# Patient Record
Sex: FEMALE | Race: WHITE | NOT HISPANIC OR LATINO | Employment: PART TIME | URBAN - METROPOLITAN AREA
[De-identification: names, ages, dates, MRNs, and addresses within clinical notes are randomized per-mention and may not be internally consistent; named-entity substitution may affect disease eponyms.]

---

## 2020-01-13 ENCOUNTER — HOSPITAL ENCOUNTER (EMERGENCY)
Facility: HOSPITAL | Age: 55
Discharge: HOME | End: 2020-01-13
Attending: EMERGENCY MEDICINE
Payer: COMMERCIAL

## 2020-01-13 VITALS
RESPIRATION RATE: 20 BRPM | WEIGHT: 145 LBS | TEMPERATURE: 98.8 F | HEART RATE: 99 BPM | BODY MASS INDEX: 21.98 KG/M2 | HEIGHT: 68 IN | SYSTOLIC BLOOD PRESSURE: 142 MMHG | OXYGEN SATURATION: 95 % | DIASTOLIC BLOOD PRESSURE: 78 MMHG

## 2020-01-13 DIAGNOSIS — F10.20 ALCOHOLISM (HCC): ICD-10-CM

## 2020-01-13 DIAGNOSIS — F10.239 ALCOHOL WITHDRAWAL (HCC): ICD-10-CM

## 2020-01-13 DIAGNOSIS — F10.929 ALCOHOL INTOXICATION (HCC): Primary | ICD-10-CM

## 2020-01-13 LAB
ALBUMIN SERPL BCP-MCNC: 3.8 G/DL (ref 3.5–5)
ALP SERPL-CCNC: 108 U/L (ref 46–116)
ALT SERPL W P-5'-P-CCNC: 149 U/L (ref 12–78)
ANION GAP SERPL CALCULATED.3IONS-SCNC: 16 MMOL/L (ref 4–13)
AST SERPL W P-5'-P-CCNC: 377 U/L (ref 5–45)
BASOPHILS # BLD AUTO: 0.08 THOUSANDS/ΜL (ref 0–0.1)
BASOPHILS NFR BLD AUTO: 1 % (ref 0–1)
BILIRUB SERPL-MCNC: 0.3 MG/DL (ref 0.2–1)
BUN SERPL-MCNC: 15 MG/DL (ref 5–25)
CALCIUM SERPL-MCNC: 8.5 MG/DL (ref 8.3–10.1)
CHLORIDE SERPL-SCNC: 102 MMOL/L (ref 100–108)
CO2 SERPL-SCNC: 24 MMOL/L (ref 21–32)
CREAT SERPL-MCNC: 0.74 MG/DL (ref 0.6–1.3)
EOSINOPHIL # BLD AUTO: 0.02 THOUSAND/ΜL (ref 0–0.61)
EOSINOPHIL NFR BLD AUTO: 0 % (ref 0–6)
ERYTHROCYTE [DISTWIDTH] IN BLOOD BY AUTOMATED COUNT: 14.7 % (ref 11.6–15.1)
ETHANOL EXG-MCNC: 0.14 MG/DL
ETHANOL EXG-MCNC: 0.28 MG/DL
GFR SERPL CREATININE-BSD FRML MDRD: 91 ML/MIN/1.73SQ M
GLUCOSE SERPL-MCNC: 79 MG/DL (ref 65–140)
HCT VFR BLD AUTO: 40.5 % (ref 34.8–46.1)
HGB BLD-MCNC: 13.8 G/DL (ref 11.5–15.4)
IMM GRANULOCYTES # BLD AUTO: 0.03 THOUSAND/UL (ref 0–0.2)
IMM GRANULOCYTES NFR BLD AUTO: 0 % (ref 0–2)
LYMPHOCYTES # BLD AUTO: 2.36 THOUSANDS/ΜL (ref 0.6–4.47)
LYMPHOCYTES NFR BLD AUTO: 32 % (ref 14–44)
MCH RBC QN AUTO: 34.2 PG (ref 26.8–34.3)
MCHC RBC AUTO-ENTMCNC: 34.1 G/DL (ref 31.4–37.4)
MCV RBC AUTO: 101 FL (ref 82–98)
MONOCYTES # BLD AUTO: 0.76 THOUSAND/ΜL (ref 0.17–1.22)
MONOCYTES NFR BLD AUTO: 10 % (ref 4–12)
NEUTROPHILS # BLD AUTO: 4.03 THOUSANDS/ΜL (ref 1.85–7.62)
NEUTS SEG NFR BLD AUTO: 57 % (ref 43–75)
NRBC BLD AUTO-RTO: 0 /100 WBCS
PLATELET # BLD AUTO: 381 THOUSANDS/UL (ref 149–390)
PMV BLD AUTO: 9.2 FL (ref 8.9–12.7)
POTASSIUM SERPL-SCNC: 4 MMOL/L (ref 3.5–5.3)
PROT SERPL-MCNC: 7.6 G/DL (ref 6.4–8.2)
RBC # BLD AUTO: 4.03 MILLION/UL (ref 3.81–5.12)
SODIUM SERPL-SCNC: 142 MMOL/L (ref 136–145)
TSH SERPL DL<=0.05 MIU/L-ACNC: 0.48 UIU/ML (ref 0.36–3.74)
WBC # BLD AUTO: 7.28 THOUSAND/UL (ref 4.31–10.16)

## 2020-01-13 PROCEDURE — 96365 THER/PROPH/DIAG IV INF INIT: CPT

## 2020-01-13 PROCEDURE — 93005 ELECTROCARDIOGRAM TRACING: CPT

## 2020-01-13 PROCEDURE — 99285 EMERGENCY DEPT VISIT HI MDM: CPT | Performed by: EMERGENCY MEDICINE

## 2020-01-13 PROCEDURE — 99284 EMERGENCY DEPT VISIT MOD MDM: CPT

## 2020-01-13 PROCEDURE — 84443 ASSAY THYROID STIM HORMONE: CPT | Performed by: EMERGENCY MEDICINE

## 2020-01-13 PROCEDURE — 96366 THER/PROPH/DIAG IV INF ADDON: CPT

## 2020-01-13 PROCEDURE — 85025 COMPLETE CBC W/AUTO DIFF WBC: CPT | Performed by: EMERGENCY MEDICINE

## 2020-01-13 PROCEDURE — 82075 ASSAY OF BREATH ETHANOL: CPT | Performed by: EMERGENCY MEDICINE

## 2020-01-13 PROCEDURE — 80053 COMPREHEN METABOLIC PANEL: CPT | Performed by: EMERGENCY MEDICINE

## 2020-01-13 PROCEDURE — 36415 COLL VENOUS BLD VENIPUNCTURE: CPT | Performed by: EMERGENCY MEDICINE

## 2020-01-13 RX ORDER — LORAZEPAM 0.5 MG/1
0.5 TABLET ORAL 3 TIMES DAILY PRN
Qty: 12 TABLET | Refills: 0 | Status: SHIPPED | OUTPATIENT
Start: 2020-01-13 | End: 2021-05-17

## 2020-01-13 RX ORDER — SODIUM CHLORIDE 9 MG/ML
125 INJECTION, SOLUTION INTRAVENOUS CONTINUOUS
Status: DISCONTINUED | OUTPATIENT
Start: 2020-01-13 | End: 2020-01-14 | Stop reason: HOSPADM

## 2020-01-13 RX ORDER — MAGNESIUM SULFATE HEPTAHYDRATE 40 MG/ML
2 INJECTION, SOLUTION INTRAVENOUS ONCE
Status: COMPLETED | OUTPATIENT
Start: 2020-01-13 | End: 2020-01-13

## 2020-01-13 RX ORDER — FOLIC ACID 1 MG/1
1 TABLET ORAL ONCE
Status: COMPLETED | OUTPATIENT
Start: 2020-01-13 | End: 2020-01-13

## 2020-01-13 RX ORDER — THIAMINE MONONITRATE (VIT B1) 100 MG
100 TABLET ORAL ONCE
Status: COMPLETED | OUTPATIENT
Start: 2020-01-13 | End: 2020-01-13

## 2020-01-13 RX ADMIN — SODIUM CHLORIDE 125 ML/HR: 0.9 INJECTION, SOLUTION INTRAVENOUS at 17:24

## 2020-01-13 RX ADMIN — FOLIC ACID 1 MG: 1 TABLET ORAL at 17:18

## 2020-01-13 RX ADMIN — MAGNESIUM SULFATE HEPTAHYDRATE 2 G: 40 INJECTION, SOLUTION INTRAVENOUS at 17:23

## 2020-01-13 RX ADMIN — THIAMINE HCL TAB 100 MG 100 MG: 100 TAB at 17:18
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
## 2020-01-14 LAB
ATRIAL RATE: 80 BPM
P AXIS: 40 DEGREES
PR INTERVAL: 130 MS
QRS AXIS: 84 DEGREES
QRSD INTERVAL: 76 MS
QT INTERVAL: 396 MS
QTC INTERVAL: 456 MS
T WAVE AXIS: 76 DEGREES
VENTRICULAR RATE: 80 BPM

## 2020-01-14 PROCEDURE — 93010 ELECTROCARDIOGRAM REPORT: CPT | Performed by: INTERNAL MEDICINE

## 2020-01-14 NOTE — ED NOTES
Per Dr Milagros Gonzalez, Pt may leave with her boyfriend    Pt is trying to call Boyfriend at this time and is unable to getin touch with him       Agusto Johnson RN  01/13/20 2043

## 2021-05-17 ENCOUNTER — HOSPITAL ENCOUNTER (INPATIENT)
Facility: HOSPITAL | Age: 56
LOS: 2 days | Discharge: HOME | End: 2021-05-19
Attending: EMERGENCY MEDICINE | Admitting: INTERNAL MEDICINE
Payer: COMMERCIAL

## 2021-05-17 ENCOUNTER — APPOINTMENT (EMERGENCY)
Dept: RADIOLOGY | Facility: HOSPITAL | Age: 56
End: 2021-05-17
Payer: COMMERCIAL

## 2021-05-17 DIAGNOSIS — F10.239 ALCOHOL WITHDRAWAL (HCC): Primary | ICD-10-CM

## 2021-05-17 DIAGNOSIS — F10.929 ALCOHOL INTOXICATION (HCC): ICD-10-CM

## 2021-05-17 DIAGNOSIS — N39.0 UTI (URINARY TRACT INFECTION): ICD-10-CM

## 2021-05-17 DIAGNOSIS — U07.1 UPPER RESPIRATORY TRACT INFECTION DUE TO COVID-19 VIRUS: ICD-10-CM

## 2021-05-17 DIAGNOSIS — F10.10 ALCOHOL ABUSE: ICD-10-CM

## 2021-05-17 DIAGNOSIS — K70.10 ALCOHOLIC HEPATITIS: ICD-10-CM

## 2021-05-17 DIAGNOSIS — J06.9 UPPER RESPIRATORY TRACT INFECTION DUE TO COVID-19 VIRUS: ICD-10-CM

## 2021-05-17 PROBLEM — F32.A DEPRESSION: Chronic | Status: ACTIVE | Noted: 2021-05-17

## 2021-05-17 PROBLEM — K21.00 GASTROESOPHAGEAL REFLUX DISEASE WITH ESOPHAGITIS WITHOUT HEMORRHAGE: Chronic | Status: ACTIVE | Noted: 2021-05-17

## 2021-05-17 PROBLEM — E87.0 HYPERNATREMIA: Status: ACTIVE | Noted: 2021-05-17

## 2021-05-17 LAB
ALBUMIN SERPL BCP-MCNC: 3.4 G/DL (ref 3.5–5)
ALP SERPL-CCNC: 285 U/L (ref 46–116)
ALT SERPL W P-5'-P-CCNC: 467 U/L (ref 12–78)
AMPHETAMINES SERPL QL SCN: NEGATIVE
ANION GAP SERPL CALCULATED.3IONS-SCNC: 18 MMOL/L (ref 4–13)
APTT PPP: 26 SECONDS (ref 23–37)
AST SERPL W P-5'-P-CCNC: 1209 U/L (ref 5–45)
ATRIAL RATE: 92 BPM
BARBITURATES UR QL: NEGATIVE
BASOPHILS # BLD AUTO: 0.07 THOUSANDS/ΜL (ref 0–0.1)
BASOPHILS NFR BLD AUTO: 1 % (ref 0–1)
BENZODIAZ UR QL: NEGATIVE
BILIRUB SERPL-MCNC: 0.6 MG/DL (ref 0.2–1)
BUN SERPL-MCNC: 15 MG/DL (ref 5–25)
CALCIUM ALBUM COR SERPL-MCNC: 9.6 MG/DL (ref 8.3–10.1)
CALCIUM SERPL-MCNC: 9.1 MG/DL (ref 8.3–10.1)
CHLORIDE SERPL-SCNC: 105 MMOL/L (ref 100–108)
CO2 SERPL-SCNC: 24 MMOL/L (ref 21–32)
COCAINE UR QL: NEGATIVE
CREAT SERPL-MCNC: 0.68 MG/DL (ref 0.6–1.3)
EOSINOPHIL # BLD AUTO: 0.07 THOUSAND/ΜL (ref 0–0.61)
EOSINOPHIL NFR BLD AUTO: 1 % (ref 0–6)
ERYTHROCYTE [DISTWIDTH] IN BLOOD BY AUTOMATED COUNT: 18 % (ref 11.6–15.1)
ETHANOL EXG-MCNC: 0.1 MG/DL
ETHANOL EXG-MCNC: 0.4 MG/DL
GFR SERPL CREATININE-BSD FRML MDRD: 97 ML/MIN/1.73SQ M
GLUCOSE SERPL-MCNC: 81 MG/DL (ref 65–140)
HCT VFR BLD AUTO: 38.9 % (ref 34.8–46.1)
HGB BLD-MCNC: 13.1 G/DL (ref 11.5–15.4)
IMM GRANULOCYTES # BLD AUTO: 0.02 THOUSAND/UL (ref 0–0.2)
IMM GRANULOCYTES NFR BLD AUTO: 0 % (ref 0–2)
INR PPP: 0.94 (ref 0.84–1.19)
LIPASE SERPL-CCNC: 275 U/L (ref 73–393)
LYMPHOCYTES # BLD AUTO: 1.65 THOUSANDS/ΜL (ref 0.6–4.47)
LYMPHOCYTES NFR BLD AUTO: 29 % (ref 14–44)
MCH RBC QN AUTO: 33 PG (ref 26.8–34.3)
MCHC RBC AUTO-ENTMCNC: 33.7 G/DL (ref 31.4–37.4)
MCV RBC AUTO: 98 FL (ref 82–98)
METHADONE UR QL: NEGATIVE
MONOCYTES # BLD AUTO: 0.61 THOUSAND/ΜL (ref 0.17–1.22)
MONOCYTES NFR BLD AUTO: 11 % (ref 4–12)
NEUTROPHILS # BLD AUTO: 3.22 THOUSANDS/ΜL (ref 1.85–7.62)
NEUTS SEG NFR BLD AUTO: 58 % (ref 43–75)
NRBC BLD AUTO-RTO: 0 /100 WBCS
OPIATES UR QL SCN: NEGATIVE
OXYCODONE+OXYMORPHONE UR QL SCN: NEGATIVE
P AXIS: 73 DEGREES
PCP UR QL: NEGATIVE
PLATELET # BLD AUTO: 227 THOUSANDS/UL (ref 149–390)
PMV BLD AUTO: 9.8 FL (ref 8.9–12.7)
POTASSIUM SERPL-SCNC: 3.7 MMOL/L (ref 3.5–5.3)
PR INTERVAL: 140 MS
PROT SERPL-MCNC: 7.9 G/DL (ref 6.4–8.2)
PROTHROMBIN TIME: 12.6 SECONDS (ref 11.6–14.5)
QRS AXIS: 83 DEGREES
QRSD INTERVAL: 80 MS
QT INTERVAL: 368 MS
QTC INTERVAL: 455 MS
RBC # BLD AUTO: 3.97 MILLION/UL (ref 3.81–5.12)
SARS-COV-2 RNA RESP QL NAA+PROBE: POSITIVE
SODIUM SERPL-SCNC: 147 MMOL/L (ref 136–145)
T WAVE AXIS: 64 DEGREES
THC UR QL: NEGATIVE
VENTRICULAR RATE: 92 BPM
WBC # BLD AUTO: 5.64 THOUSAND/UL (ref 4.31–10.16)

## 2021-05-17 PROCEDURE — 80307 DRUG TEST PRSMV CHEM ANLYZR: CPT | Performed by: EMERGENCY MEDICINE

## 2021-05-17 PROCEDURE — 85025 COMPLETE CBC W/AUTO DIFF WBC: CPT | Performed by: EMERGENCY MEDICINE

## 2021-05-17 PROCEDURE — U0003 INFECTIOUS AGENT DETECTION BY NUCLEIC ACID (DNA OR RNA); SEVERE ACUTE RESPIRATORY SYNDROME CORONAVIRUS 2 (SARS-COV-2) (CORONAVIRUS DISEASE [COVID-19]), AMPLIFIED PROBE TECHNIQUE, MAKING USE OF HIGH THROUGHPUT TECHNOLOGIES AS DESCRIBED BY CMS-2020-01-R: HCPCS | Performed by: EMERGENCY MEDICINE

## 2021-05-17 PROCEDURE — 93005 ELECTROCARDIOGRAM TRACING: CPT

## 2021-05-17 PROCEDURE — U0005 INFEC AGEN DETEC AMPLI PROBE: HCPCS | Performed by: EMERGENCY MEDICINE

## 2021-05-17 PROCEDURE — 96374 THER/PROPH/DIAG INJ IV PUSH: CPT

## 2021-05-17 PROCEDURE — 96361 HYDRATE IV INFUSION ADD-ON: CPT

## 2021-05-17 PROCEDURE — 36415 COLL VENOUS BLD VENIPUNCTURE: CPT | Performed by: EMERGENCY MEDICINE

## 2021-05-17 PROCEDURE — 71045 X-RAY EXAM CHEST 1 VIEW: CPT

## 2021-05-17 PROCEDURE — 85610 PROTHROMBIN TIME: CPT | Performed by: EMERGENCY MEDICINE

## 2021-05-17 PROCEDURE — 85730 THROMBOPLASTIN TIME PARTIAL: CPT | Performed by: EMERGENCY MEDICINE

## 2021-05-17 PROCEDURE — 93010 ELECTROCARDIOGRAM REPORT: CPT | Performed by: INTERNAL MEDICINE

## 2021-05-17 PROCEDURE — 87077 CULTURE AEROBIC IDENTIFY: CPT | Performed by: PHYSICIAN ASSISTANT

## 2021-05-17 PROCEDURE — 83690 ASSAY OF LIPASE: CPT | Performed by: EMERGENCY MEDICINE

## 2021-05-17 PROCEDURE — 80053 COMPREHEN METABOLIC PANEL: CPT | Performed by: EMERGENCY MEDICINE

## 2021-05-17 PROCEDURE — 99285 EMERGENCY DEPT VISIT HI MDM: CPT

## 2021-05-17 PROCEDURE — 87086 URINE CULTURE/COLONY COUNT: CPT | Performed by: PHYSICIAN ASSISTANT

## 2021-05-17 PROCEDURE — 99223 1ST HOSP IP/OBS HIGH 75: CPT | Performed by: PHYSICIAN ASSISTANT

## 2021-05-17 PROCEDURE — 87186 SC STD MICRODIL/AGAR DIL: CPT | Performed by: PHYSICIAN ASSISTANT

## 2021-05-17 PROCEDURE — 82075 ASSAY OF BREATH ETHANOL: CPT

## 2021-05-17 PROCEDURE — 99285 EMERGENCY DEPT VISIT HI MDM: CPT | Performed by: EMERGENCY MEDICINE

## 2021-05-17 RX ORDER — LORAZEPAM 1 MG/1
2 TABLET ORAL ONCE
Status: COMPLETED | OUTPATIENT
Start: 2021-05-17 | End: 2021-05-17

## 2021-05-17 RX ORDER — SODIUM CHLORIDE 9 MG/ML
75 INJECTION, SOLUTION INTRAVENOUS CONTINUOUS
Status: DISCONTINUED | OUTPATIENT
Start: 2021-05-17 | End: 2021-05-18

## 2021-05-17 RX ORDER — PANTOPRAZOLE SODIUM 40 MG/1
40 TABLET, DELAYED RELEASE ORAL EVERY 24 HOURS
COMMUNITY

## 2021-05-17 RX ORDER — FLUOXETINE 20 MG/1
20 TABLET, FILM COATED ORAL EVERY 24 HOURS
COMMUNITY

## 2021-05-17 RX ORDER — DIPHENOXYLATE HYDROCHLORIDE AND ATROPINE SULFATE 2.5; .025 MG/1; MG/1
1 TABLET ORAL DAILY
COMMUNITY

## 2021-05-17 RX ORDER — MULTIVITAMIN/IRON/FOLIC ACID 18MG-0.4MG
1 TABLET ORAL DAILY
Status: DISCONTINUED | OUTPATIENT
Start: 2021-05-18 | End: 2021-05-19 | Stop reason: HOSPADM

## 2021-05-17 RX ORDER — DIAZEPAM 5 MG/ML
10 INJECTION, SOLUTION INTRAMUSCULAR; INTRAVENOUS ONCE
Status: COMPLETED | OUTPATIENT
Start: 2021-05-17 | End: 2021-05-17

## 2021-05-17 RX ORDER — IBUPROFEN 200 MG
400 TABLET ORAL EVERY 8 HOURS PRN
COMMUNITY

## 2021-05-17 RX ORDER — FLUOXETINE HYDROCHLORIDE 20 MG/1
20 CAPSULE ORAL DAILY
Status: DISCONTINUED | OUTPATIENT
Start: 2021-05-18 | End: 2021-05-19 | Stop reason: HOSPADM

## 2021-05-17 RX ADMIN — LORAZEPAM 2 MG: 1 TABLET ORAL at 17:25

## 2021-05-17 RX ADMIN — LORAZEPAM 2 MG: 1 TABLET ORAL at 19:05

## 2021-05-17 RX ADMIN — SODIUM CHLORIDE 75 ML/HR: 0.9 INJECTION, SOLUTION INTRAVENOUS at 18:55

## 2021-05-17 RX ADMIN — SODIUM CHLORIDE 1000 ML: 0.9 INJECTION, SOLUTION INTRAVENOUS at 15:16

## 2021-05-17 RX ADMIN — FOLIC ACID: 5 INJECTION, SOLUTION INTRAMUSCULAR; INTRAVENOUS; SUBCUTANEOUS at 11:40

## 2021-05-17 RX ADMIN — DIAZEPAM 10 MG: 10 INJECTION, SOLUTION INTRAMUSCULAR; INTRAVENOUS at 15:16

## 2021-05-17 NOTE — ED NOTES
RN called kitchen x2 with no answer   Left message requesting a call back     Yoli Dawkins RN  05/17/21 1257

## 2021-05-17 NOTE — ASSESSMENT & PLAN NOTE
· Patient diagnosed with COVID 2 weeks ago, caused her to relapse and began drinking daily-has been sober for 3 years until then  · Drink half of a handle of vodka in the past 24 hours, last drink was last evening  · Hx of withdraw seizures  · Presenting today wanting help with detoxing from alcohol, will be admitted under [facility] protocol  · Unfortunately, no beds in the detox facility  · Received 10 mg Valium in the emergency department  · CIWA protocol  · Thiamine, folic acid, multivitamin

## 2021-05-17 NOTE — ASSESSMENT & PLAN NOTE
· Patient diagnosed initially a COVID-19 2 weeks ago  · Still with symptoms, tested positive today  · Denies shortness of breath, nausea, vomiting

## 2021-05-17 NOTE — ED PROVIDER NOTES
History  Chief Complaint   Patient presents with    Detox Evaluation     patient presents to the ED with c/o alcohol dependence with desire to go to detox, states last drink this AM      60year old female presents requesting alcohol detox  Patient states that 2 weeks ago she had been diagnosed with COVID 19  During this time, she began drinking heavily on a daily basis and has been doing so with the last drink this morning  Patient reports drinking half of a large bottle of vodka in the past 24 hours  Prior to 2 weeks ago, she states she had been sober for 3 years  Patient has continued to have symptoms of COVID 19 with cough, congestion and body aches; however, fever resolved over a week ago  She denies shortness of breath, nausea or vomiting  Patient states she had to be hospitalized in the past for alcohol withdrawal syndrome during which she states she had difficulty breathing  She denies history of hallucinations or seizures  History provided by:  Patient  Detox Evaluation  Similar prior episodes: yes    Severity:  Severe  Onset quality:  Gradual  Duration: 2 weeks  Timing:  Constant  Progression:  Worsening  Chronicity:  New  Suspected agents:  Alcohol  Associated symptoms: abdominal pain    Associated symptoms: no headaches, no nausea, no shortness of breath and no vomiting    Risk factors: recent illness        None       Past Medical History:   Diagnosis Date    Alcohol abuse        Past Surgical History:   Procedure Laterality Date    HERNIA REPAIR      HYSTERECTOMY         History reviewed  No pertinent family history  I have reviewed and agree with the history as documented      E-Cigarette/Vaping     E-Cigarette/Vaping Substances     Social History     Tobacco Use    Smoking status: Current Every Day Smoker     Packs/day: 0 50    Smokeless tobacco: Never Used   Substance Use Topics    Alcohol use: Yes     Comment: binge drinks    Drug use: Not Currently       Review of Systems Constitutional: Negative for appetite change, chills and fever  HENT: Positive for congestion  Negative for sore throat  Respiratory: Positive for cough  Negative for chest tightness and shortness of breath  Cardiovascular: Negative for chest pain and leg swelling  Gastrointestinal: Positive for abdominal pain  Negative for constipation, diarrhea, nausea and vomiting  Musculoskeletal: Positive for myalgias  Skin: Negative for rash and wound  Neurological: Negative for dizziness, syncope and headaches  All other systems reviewed and are negative  Physical Exam  Physical Exam  Vitals signs and nursing note reviewed  Constitutional:       General: She is not in acute distress  Appearance: She is well-developed  She is not toxic-appearing or diaphoretic  HENT:      Head: Normocephalic and atraumatic  Right Ear: External ear normal       Left Ear: External ear normal       Nose: Nose normal    Eyes:      General: No scleral icterus  Cardiovascular:      Rate and Rhythm: Normal rate and regular rhythm  Pulses: Normal pulses  Heart sounds: Normal heart sounds  Pulmonary:      Effort: Pulmonary effort is normal  No respiratory distress  Breath sounds: Normal breath sounds  Abdominal:      General: There is no distension  Palpations: Abdomen is soft  Tenderness: There is abdominal tenderness (generalized)  There is no guarding or rebound  Negative signs include Grimes's sign  Musculoskeletal: Normal range of motion  General: No deformity  Skin:     General: Skin is warm and dry  Findings: No rash  Neurological:      General: No focal deficit present  Mental Status: She is alert        Gait: Gait normal    Psychiatric:         Mood and Affect: Mood normal          Vital Signs  ED Triage Vitals [05/17/21 1047]   Temperature Pulse Respirations Blood Pressure SpO2   97 8 °F (36 6 °C) (!) 112 20 151/88 96 %      Temp Source Heart Rate Source Patient Position - Orthostatic VS BP Location FiO2 (%)   Temporal Monitor Sitting Right arm --      Pain Score       --           Vitals:    05/17/21 1330 05/17/21 1400 05/17/21 1515 05/17/21 1600   BP: 97/65 98/70 128/75 126/70   Pulse: 98 (!) 109 (!) 106 91   Patient Position - Orthostatic VS:  Sitting           Visual Acuity  Visual Acuity      Most Recent Value   L Pupil Size (mm)  3   R Pupil Size (mm)  3          ED Medications  Medications   folic acid 1 mg, thiamine (VITAMIN B1) 100 mg in sodium chloride 0 9 % 100 mL IV piggyback ( Intravenous New Bag 5/17/21 1140)   sodium chloride 0 9 % bolus 1,000 mL (1,000 mL Intravenous New Bag 5/17/21 1516)   diazepam (VALIUM) injection 10 mg (10 mg Intravenous Given 5/17/21 1516)       Diagnostic Studies  Results Reviewed     Procedure Component Value Units Date/Time    Rapid drug screen, urine [[id number]]  (Normal) Collected: 05/17/21 1514    Lab Status: Final result Specimen: Urine, Clean Catch Updated: 05/17/21 1538     Amph/Meth UR Negative     Barbiturate Ur Negative     Benzodiazepine Urine Negative     Cocaine Urine Negative     Methadone Urine Negative     Opiate Urine Negative     PCP Ur Negative     THC Urine Negative     Oxycodone Urine Negative    Narrative:      FOR MEDICAL PURPOSES ONLY  IF CONFIRMATION NEEDED PLEASE CONTACT THE LAB WITHIN 5 DAYS      Drug Screen Cutoff Levels:  AMPHETAMINE/METHAMPHETAMINES  1000 ng/mL  BARBITURATES     200 ng/mL  BENZODIAZEPINES     200 ng/mL  COCAINE      300 ng/mL  METHADONE      300 ng/mL  OPIATES      300 ng/mL  PHENCYCLIDINE     25 ng/mL  THC       50 ng/mL  OXYCODONE      100 ng/mL    Protime-INR [[id number]]  (Normal) Collected: 05/17/21 1356    Lab Status: Final result Specimen: Blood from Arm, Right Updated: 05/17/21 1421     Protime 12 6 seconds      INR 0 94    APTT [[id number]]  (Normal) Collected: 05/17/21 1356    Lab Status: Final result Specimen: Blood from Arm, Right Updated: 05/17/21 1421     PTT 26 seconds     POCT alcohol breath test [[id number]]  (Normal) Resulted: 05/17/21 1311    Lab Status: Final result Updated: 05/17/21 1311     EXTBreath Alcohol 0 1    Novel Coronavirus (Covid-19),PCR [facility] - 2 Hour Stat [[id number]]  (Abnormal) Collected: 05/17/21 1117    Lab Status: Final result Specimen: Nares from Nasopharyngeal Swab Updated: 05/17/21 1230     SARS-CoV-2 Positive    Narrative: The specimen collection materials, transport medium, and/or testing methodology utilized in the production of these test results have been proven to be reliable in a limited validation with an abbreviated program under the Emergency Utilization Authorization provided by the FDA  Testing reported as "Presumptive positive" will be confirmed with secondary testing to ensure result accuracy  Clinical caution and judgement should be used with the interpretation of these results with consideration of the clinical impression and other laboratory testing  Testing reported as "Positive" or "Negative" has been proven to be accurate according to standard laboratory validation requirements  All testing is performed with control materials showing appropriate reactivity at standard intervals        Comprehensive metabolic panel [[id number]]  (Abnormal) Collected: 05/17/21 1114    Lab Status: Final result Specimen: Blood from Arm, Right Updated: 05/17/21 1154     Sodium 147 mmol/L      Potassium 3 7 mmol/L      Chloride 105 mmol/L      CO2 24 mmol/L      ANION GAP 18 mmol/L      BUN 15 mg/dL      Creatinine 0 68 mg/dL      Glucose 81 mg/dL      Calcium 9 1 mg/dL      Corrected Calcium 9 6 mg/dL      AST 1,209 U/L       U/L      Alkaline Phosphatase 285 U/L      Total Protein 7 9 g/dL      Albumin 3 4 g/dL      Total Bilirubin 0 60 mg/dL      eGFR 97 ml/min/1 73sq m     Narrative:      [place] guidelines for Chronic Kidney Disease (CKD):     Stage 1 with normal or high GFR (GFR > 90 mL/min/1 73 square meters)    Stage 2 Mild CKD (GFR = 60-89 mL/min/1 73 square meters)    Stage 3A Moderate CKD (GFR = 45-59 mL/min/1 73 square meters)    Stage 3B Moderate CKD (GFR = 30-44 mL/min/1 73 square meters)    Stage 4 Severe CKD (GFR = 15-29 mL/min/1 73 square meters)    Stage 5 End Stage CKD (GFR <15 mL/min/1 73 square meters)  Note: GFR calculation is accurate only with a steady state creatinine    Lipase [[id number]]  (Normal) Collected: 05/17/21 1114    Lab Status: Final result Specimen: Blood from Arm, Right Updated: 05/17/21 1154     Lipase 275 u/L     CBC and differential [[id number]]  (Abnormal) Collected: 05/17/21 1114    Lab Status: Final result Specimen: Blood from Arm, Right Updated: 05/17/21 1128     WBC 5 64 Thousand/uL      RBC 3 97 Million/uL      Hemoglobin 13 1 g/dL      Hematocrit 38 9 %      MCV 98 fL      MCH 33 0 pg      MCHC 33 7 g/dL      RDW 18 0 %      MPV 9 8 fL      Platelets 377 Thousands/uL      nRBC 0 /100 WBCs      Neutrophils Relative 58 %      Immat GRANS % 0 %      Lymphocytes Relative 29 %      Monocytes Relative 11 %      Eosinophils Relative 1 %      Basophils Relative 1 %      Neutrophils Absolute 3 22 Thousands/µL      Immature Grans Absolute 0 02 Thousand/uL      Lymphocytes Absolute 1 65 Thousands/µL      Monocytes Absolute 0 61 Thousand/µL      Eosinophils Absolute 0 07 Thousand/µL      Basophils Absolute 0 07 Thousands/µL     POCT alcohol breath test [[id number]]  (Normal) Resulted: 05/17/21 1050    Lab Status: Final result Updated: 05/17/21 1050     EXTBreath Alcohol 0 398                 XR chest 1 view portable   ED Interpretation by Zack Garner MD (05/17 1128)   Questionable left lower lung field patchy opacity      Final Result by Rory Nieves MD (05/17 1409)      No acute cardiopulmonary disease                    Workstation performed: [id number]                    Procedures  ECG 12 Lead Documentation Only    Date/Time: 5/17/2021 11:15 AM  Performed by: Chris Wagoner MD  Authorized by: Chris Wagoner MD     Indications / Diagnosis:  Alcohol abuse  ECG reviewed by me, the ED Provider: yes    Patient location:  ED  Previous ECG:     Previous ECG:  Compared to current    Comparison ECG info:  1/13/20 normal ekg    Similarity:  No change  Interpretation:     Interpretation: normal    Rate:     ECG rate:  92    ECG rate assessment: normal    Rhythm:     Rhythm: sinus rhythm    Ectopy:     Ectopy: none    QRS:     QRS axis:  Normal    QRS intervals:  Normal  Conduction:     Conduction: normal    ST segments:     ST segments:  Normal  T waves:     T waves: flattening and inverted      Flattening:  AVL    Inverted:  V2             ED Course  ED Course as of May 17 1608   Mon May 17, 2021   1155 377 one year ago   AST(!): 1,209   1232 SARS-COV-2(!): Positive   1339 Patient likely to be transferred to detox unit at [facility] pending discharges today  10 mg IV versed if she becomes symptomatic while in the ED          1507 Patient now with mild generalized tremor and increased anxiety  Tachycardia on monitor  10 mg valium ordered  1606 Patient unable to be accommodated at [facility] Detox Unit  Toxicology recommends admission under medicine at this facility with [place] protocol  SBIRT 22yo+      Most Recent Value   SBIRT (24 yo +)   In order to provide better care to our patients, we are screening all of our patients for alcohol and drug use  Would it be okay to ask you these screening questions? No Filed at: 05/17/2021 1115                    MDM  Number of Diagnoses or Management Options  Alcohol intoxication ([facility] [address] ): new and requires workup  Alcohol withdrawal ([facility] [address] ): new and requires workup  Alcoholic hepatitis: new and requires workup  Upper respiratory tract infection due to COVID-19 virus: new and requires workup  Diagnosis management comments: 60year old female presents for evaluation of alcohol abuse requesting detox  Patient reports history of prior hospitalizations for alcohol withdrawal  Patient intoxicated upon arrival  COVID positive and has continued to be symptomatic  Questionable peripheral infiltrate on CXR consistent with COVID pneumonia  Worsening transaminitis likely secondary to alcohol induced hepatitis  Discussed with toxicology  Toxicology recommends 10 mg IV versed if she becomes symptomatic while in the ED  Unable to be transferred to [facility] Detox unit   Patient admitted under [facility] protocol for symptomatic alcohol withdrawal         Amount and/or Complexity of Data Reviewed  Clinical lab tests: ordered and reviewed  Tests in the radiology section of CPT®: ordered  Discuss the patient with other providers: yes  Independent visualization of images, tracings, or specimens: yes    Patient Progress  Patient progress: stable      Disposition  Final diagnoses:   Alcohol intoxication ([facility] [address] )   Al[facility] withdrawal [facility] )   Alcoholic hepatitis   Upper respiratory tract infection due to COVID-19 virus     Time reflects when diagnosis was documented in both MDM as applicable and the Disposition within this note     Time User Action Codes Description Comment    5/17/2021  1:40 PM Yen Fine Add [F10 929] Alcohol intoxication ([facility] )     5/17/2021  1:40 PM Yen Fine Add [F10 239] Alcohol withdrawal ([facility] [address] )     5/17/2021  1:40 PM [place] Fine Add [U07 1,  J12 82] Pneumonia due to COVID-19 virus     5/17/2021  1:41 PM Lamberto BANERJEE Add [C05 24] Alcoholic hepatitis     5/29/8748  2:12 PM Clarisse Turner Remove [U07 1,  J12 82] Pneumonia due to COVID-19 virus     5/17/2021  2:12 PM Yen Fine Add [U07 1,  J06 9] Upper respiratory tract infection due to COVID-19 virus     5/17/2021  4:03 PM [place] Fine Modify [F10 929] Alcohol intoxication ([facility] [address] )     5/17/2021  4:03 PM [place] Fine Modify [I94 310] Alcohol withdrawal [facility])       ED Disposition     ED Disposition Condition Date/Time Comment    Admit Stable Mon May 17, 2021  4:03 PM Case was discussed with ATUL and the patient's admission status was agreed to be Admission Status: inpatient status to the service of Dr Daniel Layne   Follow-up Information    None         Patient's Medications   Discharge Prescriptions    No medications on file     No discharge procedures on file      PDMP Review     None          ED Provider  Electronically Signed by           Adalgisa Marquez MD  05/17/21 5863

## 2021-05-17 NOTE — H&P
Marquise Crump  H&P- Kia Cobb 1965, 60 y o  female MRN: [id number]  Unit/Bed#: -01 Encounter: [id number]  Primary Care Provider: No primary care provider on file  Date and time admitted to hospital: 5/17/2021 10:46 AM    * Alcohol abuse  Assessment & Plan  · Patient diagnosed with COVID 2 weeks ago, caused her to relapse and began drinking daily-has been sober for 3 years until then  · Drink half of a handle of vodka in the past 24 hours, last drink was last evening  · Hx of withdraw seizures  · Presenting today wanting help with detoxing from alcohol, will be admitted under [facility] protocol  · Unfortunately, no beds in the detox facility  · Received 10 mg Valium in the emergency department  · CIWA protocol  · Thiamine, folic acid, multivitamin    Hypernatremia  Assessment & Plan  · Present on admission  · Sodium 147  · CMP in a m  · Receiving IV fluids    Alcoholic hepatitis without ascites  Assessment & Plan  · AST 1209  ·   · Alk-phos 285  · Will order right upper quadrant ultrasound for further evaluation  · CMP in a m  Depression  Assessment & Plan  · Continue fluoxetine 20 mg q d  COVID-19 virus infection  Assessment & Plan  · Patient diagnosed initially a COVID-19 2 weeks ago  · Still with symptoms, tested positive today  · Denies shortness of breath, nausea, vomiting        VTE Prophylaxis: Low risk  / sequential compression device   Code Status:  Full code  POLST: POLST form is not discussed and not completed at this time  Discussion with family:  Discussed plan of care with patient, answered questions  Anticipated Length of Stay:  Patient will be admitted on an Inpatient basis with an anticipated length of stay of  greater than 2 midnights  Justification for Hospital Stay:  Alcohol withdrawal    Total Time for Visit, including Counseling / Coordination of Care: 60 minutes    Greater than 50% of this total time spent on direct patient counseling and coordination of care  Chief Complaint:   Alcohol withdrawal    History of Present Illness:    Zohreh Guerrero is a 60 y o  female with past medical history of alcohol abuse, depression, alcoholic hepatitis who presents to the emergency department requesting help with detoxing from alcohol  According to the patient, she was diagnosed with COVID-19 approximately 2 weeks ago and due to this stressor in her life, she began drinking heavily again  She states that she has been sober now for approximately 3 years  She states that her last drink was yesterday, over the course of last 24 hours she reports drinking approximately half of a handle of vodka  The patient additionally reports withdrawal seizures in the past   Due to this, the patient came to the emergency department for assistance in detoxing  On arrival to the emergency department, the patient was with significant transaminitis, additionally was with hypernatremia of 147  Received IV normal saline in the emergency department as well as IV thiamine, folate  Patient will be admitted to the medical-surgical unit with a [facility] protocol pending placement a detox facility  Review of Systems:    Review of Systems   Constitutional: Positive for fatigue  Negative for chills, diaphoresis and fever  HENT: Negative for ear pain, postnasal drip, rhinorrhea and sore throat  Eyes: Negative for pain and visual disturbance  Respiratory: Positive for shortness of breath  Negative for cough, choking and wheezing  Cardiovascular: Negative for chest pain, palpitations and leg swelling  Gastrointestinal: Negative for abdominal pain, constipation, diarrhea, nausea and vomiting  Genitourinary: Negative for dysuria and hematuria  Musculoskeletal: Negative for arthralgias and back pain  Skin: Negative for color change and rash  Neurological: Negative for dizziness, tremors, seizures, syncope, weakness, light-headedness, numbness and headaches  Psychiatric/Behavioral: Negative for agitation and confusion  Past Medical and Surgical History:     Past Medical History:   Diagnosis Date    Alcohol abuse        Past Surgical History:   Procedure Laterality Date    HERNIA REPAIR      HYSTERECTOMY         Meds/Allergies:    Prior to Admission medications    Medication Sig Start Date End Date Taking? Authorizing Provider   FLUoxetine (PROzac) 20 MG tablet Take 20 mg by mouth every 24 hours    Historical Provider, MD   pantoprazole (PROTONIX) 40 mg tablet Take 40 mg by mouth every 24 hours    Historical Provider, MD   LORazepam (ATIVAN) 0 5 mg tablet Take 1 tablet (0 5 mg total) by mouth 3 (three) times a day as needed for anxiety or seizures for up to 2 days 1/13/20 5/17/21  Ramón Frazier DO     I have reviewed home medications with patient personally  Allergies: Allergies   Allergen Reactions    Vicodin [Hydrocodone-Acetaminophen]        Social History:     Marital Status: Single   Occupation:  Unknown  Patient Pre-hospital Living Situation:  Unknown  Patient Pre-hospital Level of Mobility:  Ambulatory  Patient Pre-hospital Diet Restrictions:  None  Substance Use History:   Social History     Substance and Sexual Activity   Alcohol Use Yes    Comment: binge drinks     Social History     Tobacco Use   Smoking Status Current Every Day Smoker    Packs/day: 0 50   Smokeless Tobacco Never Used     Social History     Substance and Sexual Activity   Drug Use Not Currently       Family History:    History reviewed  No pertinent family history  Physical Exam:     Vitals:   Blood Pressure: 157/94 (05/17/21 1821)  Pulse: 92 (05/17/21 1821)  Temperature: 99 4 °F (37 4 °C) (05/17/21 1821)  Temp Source: Temporal (05/17/21 1047)  Respirations: 18 (05/17/21 1821)  Height: 5' 8" (172 7 cm) (05/17/21 1047)  Weight - Scale: 67 1 kg (148 lb) (05/17/21 1047)  SpO2: 95 % (05/17/21 1821)    Physical Exam  Vitals signs and nursing note reviewed     Constitutional: General: She is not in acute distress  Appearance: Normal appearance  She is well-developed  She is not ill-appearing, toxic-appearing or diaphoretic  Comments: Appears younger than stated age   HENT:      Head: Normocephalic and atraumatic  Eyes:      General: No scleral icterus  Conjunctiva/sclera: Conjunctivae normal    Neck:      Musculoskeletal: Neck supple  Cardiovascular:      Rate and Rhythm: Normal rate and regular rhythm  Heart sounds: No murmur  No friction rub  No gallop  Pulmonary:      Effort: Pulmonary effort is normal  No respiratory distress  Breath sounds: Normal breath sounds  No stridor  No wheezing, rhonchi or rales  Chest:      Chest wall: No tenderness  Abdominal:      General: Abdomen is flat  Bowel sounds are normal  There is no distension  Palpations: Abdomen is soft  Tenderness: There is no abdominal tenderness  There is no guarding or rebound  Skin:     General: Skin is warm and dry  Capillary Refill: Capillary refill takes less than 2 seconds  Coloration: Skin is not jaundiced or pale  Findings: No erythema  Neurological:      General: No focal deficit present  Mental Status: She is alert and oriented to person, place, and time  Mental status is at baseline  Cranial Nerves: No cranial nerve deficit  Motor: No weakness  Comments: No asterixis or tremor noted           Additional Data:     Lab Results: I have personally reviewed pertinent reports     and I have personally reviewed pertinent films in PACS    Results from last 7 days   Lab Units 05/17/21  1114   WBC Thousand/uL 5 64   HEMOGLOBIN g/dL 13 1   HEMATOCRIT % 38 9   PLATELETS Thousands/uL 227   NEUTROS PCT % 58   LYMPHS PCT % 29   MONOS PCT % 11   EOS PCT % 1     Results from last 7 days   Lab Units 05/17/21  1114   SODIUM mmol/L 147*   POTASSIUM mmol/L 3 7   CHLORIDE mmol/L 105   CO2 mmol/L 24   BUN mg/dL 15   CREATININE mg/dL 0 68   ANION GAP mmol/L 18*   CALCIUM mg/dL 9 1   ALBUMIN g/dL 3 4*   TOTAL BILIRUBIN mg/dL 0 60   ALK PHOS U/L 285*   ALT U/L 467*   AST U/L 1,209*   GLUCOSE RANDOM mg/dL 81     Results from last 7 days   Lab Units 05/17/21  1356   INR  0 94                   Imaging: I have personally reviewed pertinent reports  and I have personally reviewed pertinent films in PACS    XR chest 1 view portable   ED Interpretation by Viola Key MD (05/17 8192)   Questionable left lower lung field patchy opacity      Final Result by Levi Palmer MD (05/17 0332)      No acute cardiopulmonary disease  Workstation performed: [id number]         US right upper quadrant    (Results Pending)     Allscripts / Epic Records Reviewed: Yes     ** Please Note: This note has been constructed using a voice recognition system   **

## 2021-05-17 NOTE — ASSESSMENT & PLAN NOTE
· AST 1209  ·   · Alk-phos 285  · Will order right upper quadrant ultrasound for further evaluation  · CMP in a m

## 2021-05-18 ENCOUNTER — APPOINTMENT (INPATIENT)
Dept: ULTRASOUND IMAGING | Facility: HOSPITAL | Age: 56
End: 2021-05-18
Payer: COMMERCIAL

## 2021-05-18 PROBLEM — E87.0 HYPERNATREMIA: Status: RESOLVED | Noted: 2021-05-17 | Resolved: 2021-05-18

## 2021-05-18 LAB
ALBUMIN SERPL BCP-MCNC: 2.6 G/DL (ref 3.5–5)
ALP SERPL-CCNC: 227 U/L (ref 46–116)
ALT SERPL W P-5'-P-CCNC: 293 U/L (ref 12–78)
ANION GAP SERPL CALCULATED.3IONS-SCNC: 9 MMOL/L (ref 4–13)
AST SERPL W P-5'-P-CCNC: 543 U/L (ref 5–45)
BACTERIA UR QL AUTO: ABNORMAL /HPF
BILIRUB SERPL-MCNC: 0.7 MG/DL (ref 0.2–1)
BILIRUB UR QL STRIP: NEGATIVE
BUN SERPL-MCNC: 11 MG/DL (ref 5–25)
CALCIUM ALBUM COR SERPL-MCNC: 8.6 MG/DL (ref 8.3–10.1)
CALCIUM SERPL-MCNC: 7.5 MG/DL (ref 8.3–10.1)
CHLORIDE SERPL-SCNC: 102 MMOL/L (ref 100–108)
CLARITY UR: ABNORMAL
CO2 SERPL-SCNC: 27 MMOL/L (ref 21–32)
COLOR UR: YELLOW
CREAT SERPL-MCNC: 0.54 MG/DL (ref 0.6–1.3)
ERYTHROCYTE [DISTWIDTH] IN BLOOD BY AUTOMATED COUNT: 17.8 % (ref 11.6–15.1)
GFR SERPL CREATININE-BSD FRML MDRD: 104 ML/MIN/1.73SQ M
GLUCOSE SERPL-MCNC: 80 MG/DL (ref 65–140)
GLUCOSE UR STRIP-MCNC: NEGATIVE MG/DL
HCT VFR BLD AUTO: 32.1 % (ref 34.8–46.1)
HGB BLD-MCNC: 11 G/DL (ref 11.5–15.4)
HGB UR QL STRIP.AUTO: ABNORMAL
KETONES UR STRIP-MCNC: NEGATIVE MG/DL
LEUKOCYTE ESTERASE UR QL STRIP: NEGATIVE
MCH RBC QN AUTO: 33.3 PG (ref 26.8–34.3)
MCHC RBC AUTO-ENTMCNC: 34.3 G/DL (ref 31.4–37.4)
MCV RBC AUTO: 97 FL (ref 82–98)
NITRITE UR QL STRIP: POSITIVE
NON-SQ EPI CELLS URNS QL MICRO: ABNORMAL /HPF
PH UR STRIP.AUTO: 7.5 [PH]
PLATELET # BLD AUTO: 151 THOUSANDS/UL (ref 149–390)
PMV BLD AUTO: 9.2 FL (ref 8.9–12.7)
POTASSIUM SERPL-SCNC: 3.5 MMOL/L (ref 3.5–5.3)
PROT SERPL-MCNC: 6.3 G/DL (ref 6.4–8.2)
PROT UR STRIP-MCNC: NEGATIVE MG/DL
RBC # BLD AUTO: 3.3 MILLION/UL (ref 3.81–5.12)
RBC #/AREA URNS AUTO: ABNORMAL /HPF
SODIUM SERPL-SCNC: 138 MMOL/L (ref 136–145)
SP GR UR STRIP.AUTO: 1.02 (ref 1–1.03)
UROBILINOGEN UR QL STRIP.AUTO: 0.2 E.U./DL
WBC # BLD AUTO: 4.55 THOUSAND/UL (ref 4.31–10.16)
WBC #/AREA URNS AUTO: ABNORMAL /HPF
WBC CLUMPS # UR AUTO: PRESENT /UL

## 2021-05-18 PROCEDURE — 99232 SBSQ HOSP IP/OBS MODERATE 35: CPT | Performed by: PHYSICIAN ASSISTANT

## 2021-05-18 PROCEDURE — 80053 COMPREHEN METABOLIC PANEL: CPT | Performed by: PHYSICIAN ASSISTANT

## 2021-05-18 PROCEDURE — 85027 COMPLETE CBC AUTOMATED: CPT | Performed by: PHYSICIAN ASSISTANT

## 2021-05-18 PROCEDURE — 81001 URINALYSIS AUTO W/SCOPE: CPT | Performed by: INTERNAL MEDICINE

## 2021-05-18 RX ORDER — CHLORDIAZEPOXIDE HYDROCHLORIDE 5 MG/1
10 CAPSULE, GELATIN COATED ORAL EVERY 8 HOURS SCHEDULED
Status: DISCONTINUED | OUTPATIENT
Start: 2021-05-18 | End: 2021-05-19 | Stop reason: HOSPADM

## 2021-05-18 RX ORDER — LORAZEPAM 1 MG/1
2 TABLET ORAL ONCE
Status: COMPLETED | OUTPATIENT
Start: 2021-05-18 | End: 2021-05-18

## 2021-05-18 RX ADMIN — CHLORDIAZEPOXIDE HYDROCHLORIDE 10 MG: 5 CAPSULE ORAL at 14:18

## 2021-05-18 RX ADMIN — NICOTINE 7 MG/24 HR DAILY TRANSDERMAL PATCH 1 PATCH: at 09:02

## 2021-05-18 RX ADMIN — FOLIC ACID: 5 INJECTION, SOLUTION INTRAMUSCULAR; INTRAVENOUS; SUBCUTANEOUS at 09:04

## 2021-05-18 RX ADMIN — CHLORDIAZEPOXIDE HYDROCHLORIDE 10 MG: 5 CAPSULE ORAL at 21:27

## 2021-05-18 RX ADMIN — LORAZEPAM 2 MG: 1 TABLET ORAL at 19:55

## 2021-05-18 RX ADMIN — LORAZEPAM 2 MG: 1 TABLET ORAL at 01:36

## 2021-05-18 RX ADMIN — MULTIPLE VITAMINS W/ MINERALS TAB 1 TABLET: TAB ORAL at 09:01

## 2021-05-18 RX ADMIN — LORAZEPAM 2 MG: 1 TABLET ORAL at 10:52

## 2021-05-18 RX ADMIN — FLUOXETINE 20 MG: 20 CAPSULE ORAL at 09:01

## 2021-05-18 NOTE — PLAN OF CARE
Problem: Potential for Falls  Goal: Patient will remain free of falls  Description: INTERVENTIONS:  - Assess patient frequently for physical needs  -  Identify cognitive and physical deficits and behaviors that affect risk of falls  -  [place] fall precautions as indicated by assessment   - Educate patient/family on patient safety including physical limitations  - Instruct patient to call for assistance with activity based on assessment  - Modify environment to reduce risk of injury  - Consider OT/PT consult to assist with strengthening/mobility  Outcome: Progressing     Problem: Prexisting or High Potential for Compromised Skin Integrity  Goal: Skin integrity is maintained or improved  Description: INTERVENTIONS:  - Identify patients at risk for skin breakdown  - Assess and monitor skin integrity  - Assess and monitor nutrition and hydration status  - Monitor labs   - Assess for incontinence   - Turn and reposition patient  - Assist with mobility/ambulation  - Relieve pressure over bony prominences  - Avoid friction and shearing  - Provide appropriate hygiene as needed including keeping skin clean and dry  - Evaluate need for skin moisturizer/barrier cream  - Collaborate with interdisciplinary team   - Patient/family teaching  - Consider wound care consult   Outcome: Progressing     Problem: Nutrition/Hydration-ADULT  Goal: Nutrient/Hydration intake appropriate for improving, restoring or maintaining nutritional needs  Description: Monitor and assess patient's nutrition/hydration status for malnutrition  Collaborate with interdisciplinary team and initiate plan and interventions as ordered  Monitor patient's weight and dietary intake as ordered or per policy  Utilize nutrition screening tool and intervene as necessary  Determine patient's food preferences and provide high-protein, high-caloric foods as appropriate       INTERVENTIONS:  - Monitor oral intake, urinary output, labs, and treatment plans  - Assess nutrition and hydration status and recommend course of action  - Evaluate amount of meals eaten  - Assist patient with eating if necessary   - Allow adequate time for meals  - Recommend/ encourage appropriate diets, oral nutritional supplements, and vitamin/mineral supplements  - Order, calculate, and assess calorie counts as needed  - Recommend, monitor, and adjust tube feedings and TPN/PPN based on assessed needs  - Assess need for intravenous fluids  - Provide specific nutrition/hydration education as appropriate  - Include patient/family/caregiver in decisions related to nutrition  Outcome: Progressing

## 2021-05-18 NOTE — PLAN OF CARE
Problem: Potential for Falls  Goal: Patient will remain free of falls  Description: INTERVENTIONS:  - Assess patient frequently for physical needs  -  Identify cognitive and physical deficits and behaviors that affect risk of falls  -  [place] fall precautions as indicated by assessment   - Educate patient/family on patient safety including physical limitations  - Instruct patient to call for assistance with activity based on assessment  - Modify environment to reduce risk of injury  - Consider OT/PT consult to assist with strengthening/mobility  Outcome: Progressing     Problem: Prexisting or High Potential for Compromised Skin Integrity  Goal: Skin integrity is maintained or improved  Description: INTERVENTIONS:  - Identify patients at risk for skin breakdown  - Assess and monitor skin integrity  - Assess and monitor nutrition and hydration status  - Monitor labs   - Assess for incontinence   - Turn and reposition patient  - Assist with mobility/ambulation  - Relieve pressure over bony prominences  - Avoid friction and shearing  - Provide appropriate hygiene as needed including keeping skin clean and dry  - Evaluate need for skin moisturizer/barrier cream  - Collaborate with interdisciplinary team   - Patient/family teaching  - Consider wound care consult   Outcome: Progressing     Problem: Nutrition/Hydration-ADULT  Goal: Nutrient/Hydration intake appropriate for improving, restoring or maintaining nutritional needs  Description: Monitor and assess patient's nutrition/hydration status for malnutrition  Collaborate with interdisciplinary team and initiate plan and interventions as ordered  Monitor patient's weight and dietary intake as ordered or per policy  Utilize nutrition screening tool and intervene as necessary  Determine patient's food preferences and provide high-protein, high-caloric foods as appropriate       INTERVENTIONS:  - Monitor oral intake, urinary output, labs, and treatment plans  - Assess nutrition and hydration status and recommend course of action  - Evaluate amount of meals eaten  - Assist patient with eating if necessary   - Allow adequate time for meals  - Recommend/ encourage appropriate diets, oral nutritional supplements, and vitamin/mineral supplements  - Order, calculate, and assess calorie counts as needed  - Recommend, monitor, and adjust tube feedings and TPN/PPN based on assessed needs  - Assess need for intravenous fluids  - Provide specific nutrition/hydration education as appropriate  - Include patient/family/caregiver in decisions related to nutrition  Outcome: Progressing

## 2021-05-18 NOTE — ASSESSMENT & PLAN NOTE
· AST 1209, , Alk-phos 285 on admission  · LFTs down trending  · RUQ ultrasound as outpatient  · CMP in a m

## 2021-05-18 NOTE — PROGRESS NOTES
[place]  Progress Note - Vinny Zhu 1965, 60 y o  female MRN: [id number]  Unit/Bed#: -01 Encounter: [id number]  Primary Care Provider: No primary care provider on file  Date and time admitted to hospital: 5/17/2021 10:46 AM    * Alcohol abuse  Assessment & Plan  · Patient diagnosed with COVID 2 weeks ago, caused her to relapse and began drinking daily-has been sober for 3 years until then  · Drink half of a handle of vodka in the past 24 hours, last drink was last evening  · Hx of withdraw seizures  · Presenting today wanting help with detoxing from alcohol, will be admitted under [facility] protocol  · Unfortunately, no beds in the detox facility  · Received 10 mg Valium in the emergency department  · CIWA protocol  · Thiamine, folic acid, multivitamin  · Librium 10 mg q 8      Alcoholic hepatitis without ascites  Assessment & Plan  · AST 1209, , Alk-phos 285 on admission  · LFTs down trending  · RUQ ultrasound as outpatient  · CMP in a m  Depression  Assessment & Plan  · Continue fluoxetine 20 mg q d  COVID-19 virus infection  Assessment & Plan  · Patient diagnosed initially a COVID-19 2 weeks ago  · Still with symptoms, tested positive again on 5/17  · Denies shortness of breath, nausea, vomiting      Hypernatremia-resolved as of 5/18/2021  Assessment & Plan  · Present on admission, resolved  · Sodium 147 on admission, now 138  · CMP in a m  · Receiving IV fluids      VTE Pharmacologic Prophylaxis:   Pharmacologic: Low risk  Mechanical VTE Prophylaxis in Place: Yes    Patient Centered Rounds: I have performed bedside rounds with nursing staff today  Discussions with Specialists or Other Care Team Provider:  None    Education and Discussions with Family / Patient:  Discussed impaired patient, patient stated that she would inform her family  Answered any questions  Time Spent for Care: 20 minutes    More than 50% of total time spent on counseling and coordination of care as described above  Current Length of Stay: 1 day(s)    Current Patient Status: Inpatient   Certification Statement: The patient will continue to require additional inpatient hospital stay due to Alcohol withdrawal    Discharge Plan:  Likely to home with 24-48 hours    Code Status: Level 1 - Full Code      Subjective:   Patient states that she is not feeling great today  States that she has the shakes  States that this is what occurred last time she withdrew from alcohol  Denies any chest pain, shortness of breath, cough, palpitations, nausea, vomiting, diarrhea, constipation, lightheadedness, dizziness, vision changes, headaches  States that she feels fatigued  Objective:     Vitals:   Temp (24hrs), Av 3 °F (37 4 °C), Min:98 2 °F (36 8 °C), Max:99 9 °F (37 7 °C)    Temp:  [98 2 °F (36 8 °C)-99 9 °F (37 7 °C)] 99 9 °F (37 7 °C)  HR:  [] 102  Resp:  [15-19] 18  BP: (123-157)/(70-94) 133/87  SpO2:  [93 %-97 %] 96 %  Body mass index is 22 5 kg/m²  Input and Output Summary (last 24 hours): Intake/Output Summary (Last 24 hours) at 2021 1454  Last data filed at 2021 1442  Gross per 24 hour   Intake 1310 ml   Output 800 ml   Net 510 ml       Physical Exam:     Physical Exam  Vitals signs and nursing note reviewed  Constitutional:       General: She is not in acute distress  Appearance: Normal appearance  She is well-developed and normal weight  She is not ill-appearing, toxic-appearing or diaphoretic  HENT:      Head: Normocephalic and atraumatic  Eyes:      General: No scleral icterus  Conjunctiva/sclera: Conjunctivae normal    Neck:      Musculoskeletal: Neck supple  Cardiovascular:      Rate and Rhythm: Regular rhythm  Tachycardia present  Heart sounds: No murmur  No friction rub  No gallop  Pulmonary:      Effort: Pulmonary effort is normal  No respiratory distress  Breath sounds: Normal breath sounds  No stridor   No wheezing, rhonchi or rales  Chest:      Chest wall: No tenderness  Abdominal:      General: Bowel sounds are normal  There is no distension  Palpations: Abdomen is soft  Tenderness: There is no abdominal tenderness  Musculoskeletal:      Right lower leg: No edema  Left lower leg: No edema  Skin:     General: Skin is warm and dry  Capillary Refill: Capillary refill takes 2 to 3 seconds  Coloration: Skin is not jaundiced or pale  Findings: No erythema  Neurological:      General: No focal deficit present  Mental Status: She is alert and oriented to person, place, and time  Mental status is at baseline  Cranial Nerves: No cranial nerve deficit  Comments: Slight tremor to bilateral hands when arms are outstretched  Additional Data:     Labs:    Results from last 7 days   Lab Units 05/18/21  0642 05/17/21  1114   WBC Thousand/uL 4 55 5 64   HEMOGLOBIN g/dL 11 0* 13 1   HEMATOCRIT % 32 1* 38 9   PLATELETS Thousands/uL 151 227   NEUTROS PCT %  --  58   LYMPHS PCT %  --  29   MONOS PCT %  --  11   EOS PCT %  --  1     Results from last 7 days   Lab Units 05/18/21  0642   SODIUM mmol/L 138   POTASSIUM mmol/L 3 5   CHLORIDE mmol/L 102   CO2 mmol/L 27   BUN mg/dL 11   CREATININE mg/dL 0 54*   ANION GAP mmol/L 9   CALCIUM mg/dL 7 5*   ALBUMIN g/dL 2 6*   TOTAL BILIRUBIN mg/dL 0 70   ALK PHOS U/L 227*   ALT U/L 293*   AST U/L 543*   GLUCOSE RANDOM mg/dL 80     Results from last 7 days   Lab Units 05/17/21  1356   INR  0 94                       * I Have Reviewed All Lab Data Listed Above  * Additional Pertinent Lab Tests Reviewed:  Dontae 64 Admission Reviewed    Imaging:    Imaging Reports Reviewed Today Include:  None  Imaging Personally Reviewed by Myself Includes:  None    Recent Cultures (last 7 days):           Last 24 Hours Medication List:   Current Facility-Administered Medications   Medication Dose Route Frequency Provider Last Rate    chlordiazePOXIDE  10 mg Oral Q8H 6225 Charly Guillermo MD      FLUoxetine  20 mg Oral Daily Maycol Lowe PA-C      folic acid 1 mg, thiamine 100 mg in 0 9% sodium chloride 100 mL IVPB   Intravenous Daily LorROD VidalC 0  (05/17/21 1210)    influenza vaccine  0 5 mL Intramuscular Prior to discharge Clau Ojeda MD      multivitamin-minerals  1 tablet Oral Daily Lorchilo Lowe PA-C      nicotine  1 patch Transdermal Daily Maycol Lowe PA-C      pneumococcal 23-valent polysaccharide vaccine  0 5 mL Subcutaneous Prior to discharge Clau Ojeda MD      sodium chloride  75 mL/hr Intravenous Continuous Maycol Lowe PA-C 75 mL/hr (05/17/21 7734)        Today, Patient Was Seen By: Maycol Lowe PA-C    ** Please Note: Dictation voice to text software may have been used in the creation of this document   **

## 2021-05-18 NOTE — ASSESSMENT & PLAN NOTE
· Patient diagnosed with COVID 2 weeks ago, caused her to relapse and began drinking daily-has been sober for 3 years until then  · Drink half of a handle of vodka in the past 24 hours, last drink was last evening  · Hx of withdraw seizures  · Presenting today wanting help with detoxing from alcohol, will be admitted under [facility] protocol  · Unfortunately, no beds in the detox facility  · Received 10 mg Valium in the emergency department  · CIWA protocol  · Thiamine, folic acid, multivitamin  · Librium 10 mg q 8

## 2021-05-18 NOTE — CASE MANAGEMENT
LOS: 1 day  Pt is not a documented bundle  Pt is not a 30 day readmission  Unplanned readmission score is 7 and green  Call placed outside of Pt's hospital room due to COVID + protocol via [facility]  Pt presents AA&Ox3  Discussed role of case management, discharge planning, identifying help at home and discharge preference  Pt reports she is currently staying with her boyfriend(Todd: [phone]) at [facility] in [facility]  Pt reports she lives in [place]  and goes back and forth between her house and her boyfriend's house  Pt reports she is independent with adls and ambulation  Pt reports she does not have POA or living will and is agreeable for information  Pt reports she does not have PCP and is in agreement for infoLink card for assistance in obtaining a PCP  Pt reports she will be using [facility] pharmacy  Pt reports she has been to inpt alcohol rehab years ago in [facility]  Pt reports she "has been bored" and began drink a bottle of wine or a small bottle vodka every day for the past few weeks  Pt reports she is not interested in detox or alcohol rehab  Pt is in agreement for alcohol resources as outpt  Will need to give information packet for alcohol resources, InfoLink Card for PCP and POA/living will information upon discharge  CM to follow

## 2021-05-18 NOTE — ASSESSMENT & PLAN NOTE
· Patient diagnosed initially a COVID-19 2 weeks ago  · Still with symptoms, tested positive again on 5/17  · Denies shortness of breath, nausea, vomiting

## 2021-05-18 NOTE — ASSESSMENT & PLAN NOTE
· Present on admission, resolved  · Sodium 147 on admission, now 138  · CMP in a m    · Receiving IV fluids

## 2021-05-18 NOTE — PLAN OF CARE
Problem: Potential for Falls  Goal: Patient will remain free of falls  Description: INTERVENTIONS:  - Assess patient frequently for physical needs  -  Identify cognitive and physical deficits and behaviors that affect risk of falls  -  [place] fall precautions as indicated by assessment   - Educate patient/family on patient safety including physical limitations  - Instruct patient to call for assistance with activity based on assessment  - Modify environment to reduce risk of injury  - Consider OT/PT consult to assist with strengthening/mobility  Outcome: Progressing     Problem: Prexisting or High Potential for Compromised Skin Integrity  Goal: Skin integrity is maintained or improved  Description: INTERVENTIONS:  - Identify patients at risk for skin breakdown  - Assess and monitor skin integrity  - Assess and monitor nutrition and hydration status  - Monitor labs   - Assess for incontinence   - Turn and reposition patient  - Assist with mobility/ambulation  - Relieve pressure over bony prominences  - Avoid friction and shearing  - Provide appropriate hygiene as needed including keeping skin clean and dry  - Evaluate need for skin moisturizer/barrier cream  - Collaborate with interdisciplinary team   - Patient/family teaching  - Consider wound care consult   Outcome: Progressing     Problem: Nutrition/Hydration-ADULT  Goal: Nutrient/Hydration intake appropriate for improving, restoring or maintaining nutritional needs  Description: Monitor and assess patient's nutrition/hydration status for malnutrition  Collaborate with interdisciplinary team and initiate plan and interventions as ordered  Monitor patient's weight and dietary intake as ordered or per policy  Utilize nutrition screening tool and intervene as necessary  Determine patient's food preferences and provide high-protein, high-caloric foods as appropriate       INTERVENTIONS:  - Monitor oral intake, urinary output, labs, and treatment plans  - Assess nutrition and hydration status and recommend course of action  - Evaluate amount of meals eaten  - Assist patient with eating if necessary   - Allow adequate time for meals  - Recommend/ encourage appropriate diets, oral nutritional supplements, and vitamin/mineral supplements  - Order, calculate, and assess calorie counts as needed  - Recommend, monitor, and adjust tube feedings and TPN/PPN based on assessed needs  - Assess need for intravenous fluids  - Provide specific nutrition/hydration education as appropriate  - Include patient/family/caregiver in decisions related to nutrition  Outcome: Progressing

## 2021-05-18 NOTE — UTILIZATION REVIEW
Initial Clinical Review    Admission: Date/Time/Statement:   Admission Orders (From admission, onward)     Ordered        05/17/21 1604  Inpatient Admission  Once                   Orders Placed This Encounter   Procedures    Inpatient Admission     Standing Status:   Standing     Number of Occurrences:   1     Order Specific Question:   Level of Care     Answer:   Med Surg [16]     Order Specific Question:   Estimated length of stay     Answer:   More than 2 Midnights     Order Specific Question:   Certification     Answer:   I certify that inpatient services are medically necessary for this patient for a duration of greater than two midnights  See H&P and MD Progress Notes for additional information about the patient's course of treatment  ED Arrival Information     Expected Arrival Acuity Means of Arrival Escorted By Service Admission Type    - 5/17/2021 10:46 Urgent Ambulance [facility]) General Medicine Urgent    Arrival Complaint    Alcohol detox        Chief Complaint   Patient presents with    Detox Evaluation     patient presents to the ED with c/o alcohol dependence with desire to go to detox, states last drink this AM        Initial Presentation: this is a 60year old female from home to ED via ems admitted inpatient due to alcohol abuse/hypernateremia/elevated liver enymes secondary to alcohol abuse  Diagnosed with COVID about 2 weeks ago; history of withdrawal seizures  Presented due to heavy drinking last 2 weeks after COVID diagnosis,  with half of large bottle of vodka last 24 hours with last drink in the morning of arrival   Has abdominal pain  On exam general abdominal tenderness  Tachycardic  COVID +  Na 147  Anion gap 18  AST 1209    In the ED given 1 liter IVF bolus, Valium IV, ativan and started on folic acid, thiamine IV  Plan is CIWA with ativan as needed   Continue thiamine, folic acid and MVI  Continue IVF, trend BMP        Date: 5/18/2021    Day 2:  Patient fatigued, has some shortness of breath  On exam on asterixis or tremor  CIWA score 5 to 10      To continue CIWA and started on librium, continue MVI/thiamine/folate  Ativan as per score  Continue IVF with improved sodium  ED Triage Vitals   Temperature Pulse Respirations Blood Pressure SpO2   05/17/21 1047 05/17/21 1047 05/17/21 1047 05/17/21 1047 05/17/21 1047   97 8 °F (36 6 °C) (!) 112 20 151/88 96 %      Temp Source Heart Rate Source Patient Position - Orthostatic VS BP Location FiO2 (%)   05/17/21 1047 05/17/21 1047 05/17/21 1047 05/17/21 1047 --   Temporal Monitor Sitting Right arm       Pain Score       05/17/21 1907       8          Wt Readings from Last 1 Encounters:   05/17/21 67 1 kg (148 lb)     Additional Vital Signs:   05/18/21 10:41:23  98 2 °F (36 8 °C)  107Abnormal   --  136/89  105  95 %  --  --   05/18/21 07:25:42  99 4 °F (37 4 °C)  102  19  135/88  104  95 %  None (Room air)  --   05/18/21 02:43:16  99 8 °F (37 7 °C)  90  --  125/73  90  94 %  --  --   05/18/21 01:21:12  99 9 °F (37 7 °C)  100  --  124/81  95  93 %  --  --   05/17/21 21:32:54  --  119Abnormal   --  123/78  93  96 %  --  --   05/17/21 19:37:14  --  96  --  141/87  105  94 %  --  --   05/17/21 18:46:18  98 6 °F (37 °C)  107Abnormal   19  157/92  114  96 %  --  --   05/17/21 18:21:07  99 4 °F (37 4 °C)  92  18  157/94  115  95 %  --  --   05/17/21 1530  --  113Abnormal   19  128/75  89  94 %  None (Room air)  Lying   05/17/21 1515  --  106Abnormal   17  128/75  --  97 %  --  --   05/17/21 1400  --  109Abnormal   18  98/70  81  96 %  None (Room air)       CIWA:  5/18/2021:  5 at 1140    10 at 1040   6 at 0640    5 at 0243    9 at 0121     5/17/2021:  4 at 1937    8 at 1846    9 at 1719    2 at 1616   8 at 1515  Pertinent Labs/Diagnostic Test Results:   5/17/2021 CxR - No acute cardiopulmonary disease      5/17/2021 ECG - Normal sinus rhythm  Normal ECG  When compared with ECG of 13-JAN-2020 17:22,  No significant change was found    Results from last 7 days   Lab Units 05/17/21  1117   SARS-COV-2  Positive*     Results from last 7 days   Lab Units 05/18/21  0642 05/17/21  1114   WBC Thousand/uL 4 55 5 64   HEMOGLOBIN g/dL 11 0* 13 1   HEMATOCRIT % 32 1* 38 9   PLATELETS Thousands/uL 151 227   NEUTROS ABS Thousands/µL  --  3 22     Results from last 7 days   Lab Units 05/18/21  0642 05/17/21  1114   SODIUM mmol/L 138 147*   POTASSIUM mmol/L 3 5 3 7   CHLORIDE mmol/L 102 105   CO2 mmol/L 27 24   ANION GAP mmol/L 9 18*   BUN mg/dL 11 15   CREATININE mg/dL 0 54* 0 68   EGFR ml/min/1 73sq m 104 97   CALCIUM mg/dL 7 5* 9 1     Results from last 7 days   Lab Units 05/18/21  0642 05/17/21  1114   AST U/L 543* 1,209*   ALT U/L 293* 467*   ALK PHOS U/L 227* 285*   TOTAL PROTEIN g/dL 6 3* 7 9   ALBUMIN g/dL 2 6* 3 4*   TOTAL BILIRUBIN mg/dL 0 70 0 60     Results from last 7 days   Lab Units 05/18/21  0642 05/17/21  1114   GLUCOSE RANDOM mg/dL 80 81     Results from last 7 days   Lab Units 05/17/21  1356   PROTIME seconds 12 6   INR  0 94   PTT seconds 26     Results from last 7 days   Lab Units 05/17/21  1114   LIPASE u/L 275     Results from last 7 days   Lab Units 05/18/21  0726   CLARITY UA  Slightly Cloudy   COLOR UA  Yellow   SPEC GRAV UA  1 020   PH UA  7 5   GLUCOSE UA mg/dl Negative   KETONES UA mg/dl Negative   BLOOD UA  Trace-Intact*   PROTEIN UA mg/dl Negative   NITRITE UA  Positive*   BILIRUBIN UA  Negative   UROBILINOGEN UA E U /dl 0 2   LEUKOCYTES UA  Negative   WBC UA /hpf 4-10*   RBC UA /hpf 0-1   BACTERIA UA /hpf Innumerable*   EPITHELIAL CELLS WET PREP /hpf None Seen     Results from last 7 days   Lab Units 05/17/21  1514   AMPH/METH  Negative   BARBITURATE UR  Negative   BENZODIAZEPINE UR  Negative   COCAINE UR  Negative   METHADONE URINE  Negative   OPIATE UR  Negative   PCP UR  Negative   THC UR  Negative     ED Treatment:   Medication Administration from 05/17/2021 1045 to 05/17/2021 1814 Date/Time Order Dose Route Action Comments     08/52/6825 6826 folic acid 1 mg, thiamine (VITAMIN B1) 100 mg in sodium chloride 0 9 % 100 mL IV piggyback   Intravenous New Bag      05/17/2021 1516 sodium chloride 0 9 % bolus 1,000 mL 1,000 mL Intravenous New Bag      05/17/2021 1516 diazepam (VALIUM) injection 10 mg 10 mg Intravenous Given      05/17/2021 1725 LORazepam (ATIVAN) tablet 2 mg 2 mg Oral Given         Past Medical History:   Diagnosis Date    Alcohol abuse      Present on Admission:  **None**      Admitting Diagnosis: Alcohol withdrawal ([address] ) [F10 239]  Alcohol intoxication ([facility] ) [M35 783]  Alcoholic hepatitis [H04 02]  Alcohol drinking problem [Z72 89]  Upper respiratory tract infection due to COVID-19 virus [U07 1, J06 9]  Age/Sex: 60 y o  female  Admission Orders:  5/17/2021 1604 inpatient   Scheduled Medications:  chlordiazePOXIDE, 10 mg, Oral, Q8H [address]  FLUoxetine, 20 mg, Oral, Daily  folic acid 1 mg, thiamine 100 mg in 0 9% sodium chloride 100 mL IVPB, , Intravenous, Daily  multivitamin-minerals, 1 tablet, Oral, Daily  nicotine, 1 patch, Transdermal, Daily    LORazepam (ATIVAN) tablet 2 mg  - used x 2 5/17, x 2 5/18  Dose: 2 mg  Freq: Once Route: PO  Indications of Use: ALCOHOL WITHDRAWAL SYNDROME    Continuous IV Infusions:  sodium chloride, 75 mL/hr, Intravenous, Continuous      PRN Meds:  influenza vaccine, 0 5 mL, Intramuscular, Prior to discharge  pneumococcal 23-valent polysaccharide vaccine, 0 5 mL, Subcutaneous, Prior to discharge    [address] Utilization Review Department  ATTENTION: Please call with any questions or concerns to [phone] and carefully listen to the prompts so that you are directed to the right person  All voicemails are confidential   House Tabitha all requests for admission clinical reviews, approved or denied determinations and any other requests to dedicated fax number below belonging to the campus where the patient is receiving treatment   List of dedicated fax numbers for the Facilities:  [address] DENIALS (Administrative/Medical Necessity) [phone]   [address]  (Maternity/NICU/Pediatrics) 261 [facility] [place] [address] [id number] [place] [address] [address] [facility] [place] [address] [phone]

## 2021-05-19 VITALS
HEIGHT: 68 IN | OXYGEN SATURATION: 98 % | SYSTOLIC BLOOD PRESSURE: 137 MMHG | HEART RATE: 100 BPM | BODY MASS INDEX: 22.43 KG/M2 | WEIGHT: 148 LBS | RESPIRATION RATE: 18 BRPM | TEMPERATURE: 98.7 F | DIASTOLIC BLOOD PRESSURE: 81 MMHG

## 2021-05-19 PROBLEM — N39.0 UTI (URINARY TRACT INFECTION): Status: ACTIVE | Noted: 2021-05-19

## 2021-05-19 LAB
ANION GAP SERPL CALCULATED.3IONS-SCNC: 10 MMOL/L (ref 4–13)
BUN SERPL-MCNC: 7 MG/DL (ref 5–25)
CALCIUM SERPL-MCNC: 8.4 MG/DL (ref 8.3–10.1)
CHLORIDE SERPL-SCNC: 103 MMOL/L (ref 100–108)
CO2 SERPL-SCNC: 27 MMOL/L (ref 21–32)
CREAT SERPL-MCNC: 0.48 MG/DL (ref 0.6–1.3)
ERYTHROCYTE [DISTWIDTH] IN BLOOD BY AUTOMATED COUNT: 17.4 % (ref 11.6–15.1)
GFR SERPL CREATININE-BSD FRML MDRD: 108 ML/MIN/1.73SQ M
GLUCOSE SERPL-MCNC: 90 MG/DL (ref 65–140)
HCT VFR BLD AUTO: 34.5 % (ref 34.8–46.1)
HGB BLD-MCNC: 11.4 G/DL (ref 11.5–15.4)
MCH RBC QN AUTO: 32.6 PG (ref 26.8–34.3)
MCHC RBC AUTO-ENTMCNC: 33 G/DL (ref 31.4–37.4)
MCV RBC AUTO: 99 FL (ref 82–98)
PLATELET # BLD AUTO: 151 THOUSANDS/UL (ref 149–390)
PMV BLD AUTO: 10.4 FL (ref 8.9–12.7)
POTASSIUM SERPL-SCNC: 3.2 MMOL/L (ref 3.5–5.3)
RBC # BLD AUTO: 3.5 MILLION/UL (ref 3.81–5.12)
SODIUM SERPL-SCNC: 140 MMOL/L (ref 136–145)
WBC # BLD AUTO: 3.24 THOUSAND/UL (ref 4.31–10.16)

## 2021-05-19 PROCEDURE — 85027 COMPLETE CBC AUTOMATED: CPT | Performed by: PHYSICIAN ASSISTANT

## 2021-05-19 PROCEDURE — 80048 BASIC METABOLIC PNL TOTAL CA: CPT | Performed by: PHYSICIAN ASSISTANT

## 2021-05-19 PROCEDURE — 99239 HOSP IP/OBS DSCHRG MGMT >30: CPT | Performed by: PHYSICIAN ASSISTANT

## 2021-05-19 PROCEDURE — RECHECK: Performed by: PHYSICIAN ASSISTANT

## 2021-05-19 RX ORDER — POTASSIUM CHLORIDE 20 MEQ/1
40 TABLET, EXTENDED RELEASE ORAL ONCE
Status: COMPLETED | OUTPATIENT
Start: 2021-05-19 | End: 2021-05-19

## 2021-05-19 RX ORDER — FOLIC ACID 1 MG/1
1 TABLET ORAL DAILY
Qty: 30 TABLET | Refills: 0 | Status: SHIPPED | OUTPATIENT
Start: 2021-05-19

## 2021-05-19 RX ORDER — SULFAMETHOXAZOLE AND TRIMETHOPRIM 800; 160 MG/1; MG/1
1 TABLET ORAL EVERY 12 HOURS SCHEDULED
Status: DISCONTINUED | OUTPATIENT
Start: 2021-05-19 | End: 2021-05-19 | Stop reason: HOSPADM

## 2021-05-19 RX ORDER — MULTIVITAMIN/IRON/FOLIC ACID 18MG-0.4MG
1 TABLET ORAL DAILY
Qty: 30 TABLET | Refills: 0 | Status: SHIPPED | OUTPATIENT
Start: 2021-05-20

## 2021-05-19 RX ORDER — SULFAMETHOXAZOLE AND TRIMETHOPRIM 800; 160 MG/1; MG/1
1 TABLET ORAL EVERY 12 HOURS SCHEDULED
Qty: 2 TABLET | Refills: 0 | Status: SHIPPED | OUTPATIENT
Start: 2021-05-19 | End: 2021-05-20

## 2021-05-19 RX ADMIN — SULFAMETHOXAZOLE AND TRIMETHOPRIM 1 TABLET: 800; 160 TABLET ORAL at 14:06

## 2021-05-19 RX ADMIN — CHLORDIAZEPOXIDE HYDROCHLORIDE 10 MG: 5 CAPSULE ORAL at 14:06

## 2021-05-19 RX ADMIN — CHLORDIAZEPOXIDE HYDROCHLORIDE 10 MG: 5 CAPSULE ORAL at 05:21

## 2021-05-19 RX ADMIN — POTASSIUM CHLORIDE 40 MEQ: 1500 TABLET, EXTENDED RELEASE ORAL at 14:06

## 2021-05-19 RX ADMIN — FOLIC ACID: 5 INJECTION, SOLUTION INTRAMUSCULAR; INTRAVENOUS; SUBCUTANEOUS at 08:07

## 2021-05-19 RX ADMIN — FLUOXETINE 20 MG: 20 CAPSULE ORAL at 08:06

## 2021-05-19 RX ADMIN — POTASSIUM CHLORIDE 40 MEQ: 1500 TABLET, EXTENDED RELEASE ORAL at 09:39

## 2021-05-19 RX ADMIN — MULTIPLE VITAMINS W/ MINERALS TAB 1 TABLET: TAB ORAL at 08:05

## 2021-05-19 RX ADMIN — NICOTINE 7 MG/24 HR DAILY TRANSDERMAL PATCH 1 PATCH: at 08:07

## 2021-05-19 NOTE — ASSESSMENT & PLAN NOTE
· Patient diagnosed with COVID 2 weeks ago, caused her to relapse and began drinking daily-has been sober for 3 years until then  · Drink half of a handle of vodka in the past 24 hours, last drink was evening prior to admission  · Hx of withdraw seizures  · Did to ED wanted help with detoxing from alcohol, will be admitted under [facility] protocol  · Ongoing discussion, patient was refusing rehab until approached with discharge on 05/19, now agreeable to rehab, she would like to continue on Librium  · Case management working on discharge plan  · Unfortunately, no beds in the detox facility  · Received 10 mg Valium in the emergency department  · CIWA protocol  · Thiamine, folic acid, multivitamin  · Librium 10 mg q 8 now to q12

## 2021-05-19 NOTE — DISCHARGE SUMMARY
[facility]  [facility]- Chandu García 1965, 60 y o  female MRN: [id number]  Unit/Bed#: -01 Encounter: [id number]  Primary Care Provider: No primary care provider on file  Date and time admitted to hospital: 5/17/2021 10:46 AM    * Alcohol abuse  Assessment & Plan  · Patient diagnosed with COVID 2 weeks ago, caused her to relapse and began drinking daily-has been sober for 3 years until then  · Drink half of a handle of vodka in the past 24 hours, last drink was evening prior to admission  · Hx of withdraw seizures  · Did to ED wanted help with detoxing from alcohol, will be admitted under [facility] protocol  · Ongoing discussion, patient was refusing rehab until approached with discharge on 05/19, now agreeable to rehab, she would like to continue on Librium  · Case management working on discharge plan  · Unfortunately, no beds in the detox facility  · Received 10 mg Valium in the emergency department  · [facility] protocol  · Thiamine, folic acid, multivitamin  · Librium 10 mg q 8 now to q12      UTI (urinary tract infection)  Assessment & Plan  · Culture suspicious for E  coli  · Will treat with 3 day course of Bactrim    Alcoholic hepatitis without ascites  Assessment & Plan  · AST 1209, , Alk-phos 285 on admission  · LFTs down trending  · RUQ ultrasound as outpatient  · CMP in a m  Depression  Assessment & Plan  · Continue fluoxetine 20 mg q d  COVID-19 virus infection  Assessment & Plan  · Patient diagnosed initially a COVID-19 >2 weeks ago  · Should no longer require contact precautions  · Still with symptoms, tested positive again on 5/17  · Denies shortness of breath, nausea, vomiting      Resolved Problems  Date Reviewed: 5/19/2021          Resolved    Hypernatremia 5/18/2021     Resolved by  Cedric Renee PA-C        Discharging Physician / Practitioner: Niki Gonzalez PA-C  PCP: No primary care provider on file    Admission Date:   Admission Orders (From admission, onward)     Ordered        05/17/21 1604  Inpatient Admission  Once                   Discharge Date: 05/19/21    Consultations During Hospital Stay:  · None    Procedures Performed:   · None    Significant Findings / Test Results:   · CXR 5/17:  No acute cardiopulmonary disease    Incidental Findings:   · None     Test Results Pending at Discharge (will require follow up): · None     Outpatient Tests Requested:  · None    Complications:  Patient change decision in regards to alcohol rehab multiple times    Reason for Admission:  Alcohol withdrawal    Hospital Course:   Stefan Zarate is a 60 y o  female patient with past medical history of alcohol abuse, depression, alcoholic hepatitis who originally presented to the hospital on 5/17/2021 due to requesting help with detoxing from alcohol  Patient was diagnosed with COVID-19 over 2 weeks prior to admission and began drinking heavily to cope with this  She was sober for approximately 3 years  Last drink was the day prior to admission, she drank half a handle of vodka  She reports a history of withdrawal seizures  She did receive IV normal saline in emergency department as well as thiamine and folate  She was admitted under [facility] protocol  She was awaiting placement for detox facility but decided today that she no longer wishes to go to a detox facility or rehab  She is agreeable to discharge outpatient and wants to follow-up with AA  A mild tremor is exacerbated when attention is brought to at otherwise appears hemodynamically stable and appropriate for outpatient follow-up  Encourage patient to stop alcohol abuse    Please see above list of diagnoses and related plan for additional information  Condition at Discharge: stable    Discharge Day Visit / Exam:   * Please refer to separate progress note for these details *    Discussion with Family: Patient declined call to        Discharge instructions/Information to patient and family:   See after visit summary for information provided to patient and family  Provisions for Follow-Up Care:  See after visit summary for information related to follow-up care and any pertinent home health orders  Disposition:   Home    Planned Readmission: none     Discharge Statement:  I spent 65 minutes discharging the patient  This time was spent on the day of discharge  I had direct contact with the patient on the day of discharge  Greater than 50% of the total time was spent examining patient, answering all patient questions, arranging and discussing plan of care with patient as well as directly providing post-discharge instructions  Additional time then spent on discharge activities  Discharge Medications:  See after visit summary for reconciled discharge medications provided to patient and/or family        **Please Note: This note may have been constructed using a voice recognition system**

## 2021-05-19 NOTE — CASE MANAGEMENT
Continue to follow  Call placed to [facility], facility does not accept Pt's insurance  Call placed to Pt outside hospital room via geo lopez  Pt reports that she cannot return to her boyfriend's house because of her COVID status nor can she go back to her house in [place] due to family member there and her COVID status  Pt reports that she cannot stay with friends because of her COVID status even though she had COVID over 2 weeks ago  CM reached out to [facility] with [facility] for assistance for shelters or hotels  Magalie DHALIWAL informed CM that quarantine hotel would be for residents in [facility] and [facility], even though at this time Pt will not need to be in quarantine  Magalie DHALIWAL directed CM to speak with Ezell Paget at [facility] for any suggestions  Call placed to  Ezell Paget at [address] [facility] informed CM that their agency only works with residents in [place]  Call placed to Pt outside of hospital room via geo lopez, Pt reports she is resident of [facility]  Pt reports she does not have her credit card, it is at her home in [place] but does have cash to stay in a hotel  Pt reports she will be going to [employer] on [address] in [facility]  Pt reports she will need Lyft ride to [facility]  Per PA-C, Pt does not need to be in contact precautions and is not contagious  Pt can go by Lyft  Call placed to [facility], spoke with Lenore Greenfield ride arranged to [address] in [facility]  Pickup is between 4:45pm/5:00pm  Pt's nurse(Mariposa) informed of Lyft transport time  Information for POA/living will, alcohol rehab  resources and [employer] Card for assistance in finding PCP given to Pt

## 2021-05-19 NOTE — PROGRESS NOTES
[place]  Progress Note - Deanna Lundberg 1965, 60 y o  female MRN: [id number]  Unit/Bed#: -01 Encounter: [id number]  Primary Care Provider: No primary care provider on file  Date and time admitted to hospital: 5/17/2021 10:46 AM    * Alcohol abuse  Assessment & Plan  · Patient diagnosed with COVID 2 weeks ago, caused her to relapse and began drinking daily-has been sober for 3 years until then  · Drink half of a handle of vodka in the past 24 hours, last drink was evening prior to admission  · Hx of withdraw seizures  · Did to ED wanted help with detoxing from alcohol, will be admitted under [facility] protocol  · Ongoing discussion, patient was refusing rehab until approached with discharge on 05/19, now agreeable to rehab, she would like to continue on Librium  · Case management working on discharge plan  · Unfortunately, no beds in the detox facility  · Received 10 mg Valium in the emergency department  · CIWA protocol  · Thiamine, folic acid, multivitamin  · Librium 10 mg q 8 now to q12      UTI (urinary tract infection)  Assessment & Plan  · Culture suspicious for E  coli  · Will treat with 3 day course of Bactrim    Alcoholic hepatitis without ascites  Assessment & Plan  · AST 1209, , Alk-phos 285 on admission  · LFTs down trending  · RUQ ultrasound as outpatient  · CMP in a m  Depression  Assessment & Plan  · Continue fluoxetine 20 mg q d  COVID-19 virus infection  Assessment & Plan  · Patient diagnosed initially a COVID-19 >2 weeks ago  · Should no longer require contact precautions  · Still with symptoms, tested positive again on 5/17  · Denies shortness of breath, nausea, vomiting        VTE Pharmacologic Prophylaxis: VTE Score: 1 Low Risk (Score 0-2) - Encourage Ambulation  Patient Centered Rounds: I performed bedside rounds with nursing staff today    Discussions with Specialists or Other Care Team Provider:  Discussed with case management, patient is now agreeable to rehab after she was approached with discharge today and had previously refused  Education and Discussions with Family / Patient: Patient declined call to   Time Spent for Care: 30 minutes  More than 50% of total time spent on counseling and coordination of care as described above  Current Length of Stay: 2 day(s)  Current Patient Status: Inpatient   Certification Statement: The patient will continue to require additional inpatient hospital stay due to discharge planning  Discharge Plan: Medically stable, await bed availability at [facility] rehab    Code Status: Level 1 - Full Code    Subjective:   Patient reports she feels well today, denies ongoing shortness of breath  She still notes a tremor which is only present when she acknowledges it or thinks about it  Initially had refused rehab and is agreeable to discharge home however when Case Management went in to confirm this discharge plan she changed her mind again  Will now continue to pursue rehab placement  Objective:     Vitals:   Temp (24hrs), Av 3 °F (37 4 °C), Min:98 7 °F (37 1 °C), Max:99 9 °F (37 7 °C)    Temp:  [98 7 °F (37 1 °C)-99 9 °F (37 7 °C)] 98 7 °F (37 1 °C)  HR:  [] 100  Resp:  [18] 18  BP: (133-144)/(81-92) 137/81  SpO2:  [93 %-100 %] 98 %  Body mass index is 22 5 kg/m²  Input and Output Summary (last 24 hours): Intake/Output Summary (Last 24 hours) at 2021 1222  Last data filed at 2021 0807  Gross per 24 hour   Intake 780 ml   Output 1400 ml   Net -620 ml       Physical Exam:   Physical Exam  Vitals signs and nursing note reviewed  Constitutional:       General: She is not in acute distress  Appearance: Normal appearance  She is well-developed  HENT:      Head: Normocephalic and atraumatic  Eyes:      General: No scleral icterus  Conjunctiva/sclera: Conjunctivae normal    Cardiovascular:      Rate and Rhythm: Regular rhythm  Tachycardia present        Heart sounds: No murmur  Pulmonary:      Effort: Pulmonary effort is normal       Breath sounds: No wheezing, rhonchi or rales  Abdominal:      General: There is no distension  Palpations: Abdomen is soft  Skin:     General: Skin is warm and dry  Neurological:      General: No focal deficit present  Mental Status: She is alert  Motor: Tremor present  Psychiatric:         Mood and Affect: Mood normal          Additional Data:     Labs:  Results from last 7 days   Lab Units 05/19/21  0448  05/17/21  1114   WBC Thousand/uL 3 24*   < > 5 64   HEMOGLOBIN g/dL 11 4*   < > 13 1   HEMATOCRIT % 34 5*   < > 38 9   PLATELETS Thousands/uL 151   < > 227   NEUTROS PCT %  --   --  58   LYMPHS PCT %  --   --  29   MONOS PCT %  --   --  11   EOS PCT %  --   --  1    < > = values in this interval not displayed  Results from last 7 days   Lab Units 05/19/21  0448 05/18/21  0642   SODIUM mmol/L 140 138   POTASSIUM mmol/L 3 2* 3 5   CHLORIDE mmol/L 103 102   CO2 mmol/L 27 27   BUN mg/dL 7 11   CREATININE mg/dL 0 48* 0 54*   ANION GAP mmol/L 10 9   CALCIUM mg/dL 8 4 7 5*   ALBUMIN g/dL  --  2 6*   TOTAL BILIRUBIN mg/dL  --  0 70   ALK PHOS U/L  --  227*   ALT U/L  --  293*   AST U/L  --  543*   GLUCOSE RANDOM mg/dL 90 80     Results from last 7 days   Lab Units 05/17/21  1356   INR  0 94                   Lines/Drains:  Invasive Devices     Peripheral Intravenous Line            Peripheral IV 05/18/21 Dorsal (posterior); Right Forearm 1 day                      Imaging: No pertinent imaging reviewed      Recent Cultures (last 7 days):   Results from last 7 days   Lab Units 05/17/21  1514   URINE CULTURE  >100,000 cfu/ml Gram Negative Deni resembling Escherichia coli*       Last 24 Hours Medication List:   Current Facility-Administered Medications   Medication Dose Route Frequency Provider Last Rate    chlordiazePOXIDE  10 mg Oral Q8H 6225 Charly Guillermo MD      FLUoxetine  20 mg Oral Daily Srinivasan Francisco PA-C      folic acid 1 mg, thiamine 100 mg in 0 9% sodium chloride 100 mL IVPB   Intravenous Daily JUMA Browne 0  (05/17/21 1210)    influenza vaccine  0 5 mL Intramuscular Prior to discharge Kyrie Mckeon MD      multivitamin-minerals  1 tablet Oral Daily JUMA Browne      nicotine  1 patch Transdermal Daily JUMA Browne      pneumococcal 23-valent polysaccharide vaccine  0 5 mL Subcutaneous Prior to discharge Kyrie Mckeon MD      potassium chloride  40 mEq Oral Once [employer], PA-C      sulfamethoxazole-trimethoprim  1 tablet Oral Q12H [address] [employer], JUMA          Today, Patient Was Seen By: Mary Noel PA-C    **Please Note: This note may have been constructed using a voice recognition system  **

## 2021-05-19 NOTE — PROGRESS NOTES
Pastoral Care Progress Note    2021  Patient: Archie Pena : 1965  Admission Date & Time: 2021 1046  MRN: [id number] [facility]: [id number]                     Chaplaincy Interventions Utilized:   Relationship Building: Cultivated a relationship of care and support  Introduced myself to patient and offered the option of a prayer, which the patient accepted     Ritual: Provided prayer       21 1200   Clinical Encounter Type   Visited With Patient   Routine Visit Introduction   Referral To   (census/rounds)   [facility] Encounters   [facility] Needs Prayer        21 1200   Clinical Encounter Type   Visited With Patient   Routine Visit Introduction   Referral To   (census/rounds)   [facility] Encounters   [facility] Needs Prayer

## 2021-05-19 NOTE — PLAN OF CARE
Problem: Potential for Falls  Goal: Patient will remain free of falls  Description: INTERVENTIONS:  - Assess patient frequently for physical needs  -  Identify cognitive and physical deficits and behaviors that affect risk of falls  -  [place] fall precautions as indicated by assessment   - Educate patient/family on patient safety including physical limitations  - Instruct patient to call for assistance with activity based on assessment  - Modify environment to reduce risk of injury  - Consider OT/PT consult to assist with strengthening/mobility  Outcome: Progressing     Problem: Prexisting or High Potential for Compromised Skin Integrity  Goal: Skin integrity is maintained or improved  Description: INTERVENTIONS:  - Identify patients at risk for skin breakdown  - Assess and monitor skin integrity  - Assess and monitor nutrition and hydration status  - Monitor labs   - Assess for incontinence   - Turn and reposition patient  - Assist with mobility/ambulation  - Relieve pressure over bony prominences  - Avoid friction and shearing  - Provide appropriate hygiene as needed including keeping skin clean and dry  - Evaluate need for skin moisturizer/barrier cream  - Collaborate with interdisciplinary team   - Patient/family teaching  - Consider wound care consult   Outcome: Progressing     Problem: Nutrition/Hydration-ADULT  Goal: Nutrient/Hydration intake appropriate for improving, restoring or maintaining nutritional needs  Description: Monitor and assess patient's nutrition/hydration status for malnutrition  Collaborate with interdisciplinary team and initiate plan and interventions as ordered  Monitor patient's weight and dietary intake as ordered or per policy  Utilize nutrition screening tool and intervene as necessary  Determine patient's food preferences and provide high-protein, high-caloric foods as appropriate       INTERVENTIONS:  - Monitor oral intake, urinary output, labs, and treatment plans  - Assess nutrition and hydration status and recommend course of action  - Evaluate amount of meals eaten  - Assist patient with eating if necessary   - Allow adequate time for meals  - Recommend/ encourage appropriate diets, oral nutritional supplements, and vitamin/mineral supplements  - Order, calculate, and assess calorie counts as needed  - Recommend, monitor, and adjust tube feedings and TPN/PPN based on assessed needs  - Assess need for intravenous fluids  - Provide specific nutrition/hydration education as appropriate  - Include patient/family/caregiver in decisions related to nutrition  Outcome: Progressing

## 2021-05-19 NOTE — ASSESSMENT & PLAN NOTE
· Patient diagnosed initially a COVID-19 >2 weeks ago  · Should no longer require contact precautions  · Still with symptoms, tested positive again on 5/17  · Denies shortness of breath, nausea, vomiting

## 2021-05-19 NOTE — CASE MANAGEMENT
Continue to follow  Spoke with Pt outside of hospital room via [facility] due to COVID precautions  Pt is now requesting alcohol rehab  Pt is agreeable for [facility], [facility], [facility]  Call placed to [facility], facilities do not have beds available today  Call placed to [facility], faxed information  Facility not able to accept due to COVID + status even though it was 2 weeks ago that Pt was diagnosed  [facility] cannot accept Pt  Call placed to [facility], left message  Anticipate return call  CM to follow

## 2021-05-19 NOTE — ASSESSMENT & PLAN NOTE
· Patient diagnosed with COVID 2 weeks ago, caused her to relapse and began drinking daily-has been sober for 3 years until then  · Drink half of a handle of vodka in the past 24 hours, last drink was evening prior to admission  · Hx of withdraw seizures  · Did to ED wanted help with detoxing from alcohol, will be admitted under [facility] protocol  · Ongoing discussion, patient was refusing rehab until approached with discharge on 05/19, now agreeable to rehab, she would like to continue on Librium  · Case management working on discharge plan  · Unfortunately, no beds in the detox facility  · Received 10 mg Valium in the emergency department  · CIWA protocol  · Thiamine, folic acid, multivitamin  · Librium 10 mg q 8 now to q12

## 2021-05-20 LAB — BACTERIA UR CULT: ABNORMAL
